# Patient Record
Sex: FEMALE | Race: WHITE | NOT HISPANIC OR LATINO | Employment: OTHER | URBAN - METROPOLITAN AREA
[De-identification: names, ages, dates, MRNs, and addresses within clinical notes are randomized per-mention and may not be internally consistent; named-entity substitution may affect disease eponyms.]

---

## 2019-07-13 ENCOUNTER — HOSPITAL ENCOUNTER (EMERGENCY)
Facility: CLINIC | Age: 69
Discharge: HOME | End: 2019-07-13
Attending: PHYSICIAN ASSISTANT | Admitting: PHYSICIAN ASSISTANT
Payer: COMMERCIAL

## 2019-07-13 VITALS
RESPIRATION RATE: 18 BRPM | OXYGEN SATURATION: 95 % | SYSTOLIC BLOOD PRESSURE: 94 MMHG | DIASTOLIC BLOOD PRESSURE: 64 MMHG | WEIGHT: 161 LBS | BODY MASS INDEX: 31.61 KG/M2 | HEIGHT: 60 IN | HEART RATE: 109 BPM | TEMPERATURE: 98.2 F

## 2019-07-13 DIAGNOSIS — M54.16 LUMBAR RADICULOPATHY: ICD-10-CM

## 2019-07-13 PROCEDURE — 99284 EMERGENCY DEPT VISIT MOD MDM: CPT | Mod: 25

## 2019-07-13 PROCEDURE — 96372 THER/PROPH/DIAG INJ SC/IM: CPT

## 2019-07-13 PROCEDURE — 25000128 H RX IP 250 OP 636: Performed by: PHYSICIAN ASSISTANT

## 2019-07-13 PROCEDURE — 25000132 ZZH RX MED GY IP 250 OP 250 PS 637: Performed by: PHYSICIAN ASSISTANT

## 2019-07-13 RX ORDER — LETROZOLE 2.5 MG/1
2.5 TABLET, FILM COATED ORAL DAILY
COMMUNITY

## 2019-07-13 RX ORDER — ASPIRIN 81 MG/1
81 TABLET, CHEWABLE ORAL DAILY
COMMUNITY

## 2019-07-13 RX ORDER — LIDOCAINE 4 G/G
1 PATCH TOPICAL ONCE
Status: DISCONTINUED | OUTPATIENT
Start: 2019-07-13 | End: 2019-07-13 | Stop reason: HOSPADM

## 2019-07-13 RX ORDER — KETOROLAC TROMETHAMINE 30 MG/ML
30 INJECTION, SOLUTION INTRAMUSCULAR; INTRAVENOUS ONCE
Status: COMPLETED | OUTPATIENT
Start: 2019-07-13 | End: 2019-07-13

## 2019-07-13 RX ORDER — PREGABALIN 200 MG/1
100 CAPSULE ORAL 3 TIMES DAILY
Status: ON HOLD | COMMUNITY
End: 2024-04-27

## 2019-07-13 RX ORDER — HYDROCODONE BITARTRATE AND ACETAMINOPHEN 5; 325 MG/1; MG/1
1 TABLET ORAL ONCE
Status: COMPLETED | OUTPATIENT
Start: 2019-07-13 | End: 2019-07-13

## 2019-07-13 RX ORDER — MONTELUKAST SODIUM 10 MG/1
10 TABLET ORAL AT BEDTIME
COMMUNITY

## 2019-07-13 RX ORDER — FUROSEMIDE 40 MG
40 TABLET ORAL DAILY
Status: ON HOLD | COMMUNITY
End: 2024-05-01

## 2019-07-13 RX ORDER — CETIRIZINE HYDROCHLORIDE 10 MG/1
10 TABLET ORAL DAILY
COMMUNITY

## 2019-07-13 RX ORDER — SPIRONOLACTONE 25 MG/1
25 TABLET ORAL DAILY
Status: ON HOLD | COMMUNITY
End: 2024-04-27

## 2019-07-13 RX ORDER — METHOCARBAMOL 750 MG/1
750 TABLET, FILM COATED ORAL ONCE
Status: COMPLETED | OUTPATIENT
Start: 2019-07-13 | End: 2019-07-13

## 2019-07-13 RX ORDER — UBIDECARENONE 100 MG
CAPSULE ORAL DAILY
COMMUNITY

## 2019-07-13 RX ORDER — LOSARTAN POTASSIUM 25 MG/1
25 TABLET ORAL DAILY
Status: ON HOLD | COMMUNITY
End: 2024-04-27

## 2019-07-13 RX ADMIN — METHOCARBAMOL TABLETS 750 MG: 750 TABLET, COATED ORAL at 12:52

## 2019-07-13 RX ADMIN — KETOROLAC TROMETHAMINE 30 MG: 30 INJECTION, SOLUTION INTRAMUSCULAR; INTRAVENOUS at 12:53

## 2019-07-13 RX ADMIN — HYDROCODONE BITARTRATE AND ACETAMINOPHEN 1 TABLET: 5; 325 TABLET ORAL at 12:52

## 2019-07-13 RX ADMIN — HYDROCODONE BITARTRATE AND ACETAMINOPHEN 1 TABLET: 5; 325 TABLET ORAL at 14:01

## 2019-07-13 RX ADMIN — LIDOCAINE 1 PATCH: 560 PATCH PERCUTANEOUS; TOPICAL; TRANSDERMAL at 12:55

## 2019-07-13 ASSESSMENT — ENCOUNTER SYMPTOMS
WEAKNESS: 1
NECK PAIN: 0
ABDOMINAL PAIN: 0
BACK PAIN: 1
HEADACHES: 0
NUMBNESS: 0
MYALGIAS: 1
SHORTNESS OF BREATH: 0

## 2019-07-13 ASSESSMENT — MIFFLIN-ST. JEOR: SCORE: 1176.79

## 2019-07-13 NOTE — ED NOTES
Bed: ED22  Expected date: 7/13/19  Expected time:   Means of arrival: Ambulance  Comments:  Vanessa Roberson

## 2019-07-13 NOTE — ED TRIAGE NOTES
pt comes in from home aftr fall in bathroom. pt has bulging disc. declined neck pain. pt not able to stand, normally uses walker. tylenol 650mg oral for pain. BP 90/50 in route.

## 2019-07-13 NOTE — ED PROVIDER NOTES
History     Chief Complaint:  Back Pain and Fall    The history is provided by the patient.      Cathleen Ty is a 68 year old female with a history of CHF, hyperlipidemia, hypertension, and a bulging L3 disc, who presents to the emergency department via EMS for evaluation of a fall with back pain. For the last three months, the patient has been experiencing lower back pain due to her bulging L3 disc. To note, 9 days ago, the patient was admitted to [facility] for her back pain and discharged home with home PT. This morning, the patient got up to use the bathroom this morning. She was using her walker to get there, when that got away from her, which caused her to loss her balance, and fall on her right side. She states she hit her head but did not lose consciousness. She complains of bilateral arm pain and pain on the front of her right leg that goes down to her foot. She also states she feels weak and cannot walk on it. Her fall and inability to get up prompted the patient to call EMS who transferred her here for further evaluation.   Patient denies any new pain in her back. Patient denies any numbness or tingling sensation in her extremities, chest pain, abdominal pain, neck pain, headache, or use of blood thinners.     Allergies:  Aspirin  Atorvastatin calcium  Dilaudid  Penicillins  Percocet  Sulfa drugs      Medications:    Aspirin  Zyrtec  Hydrocodone  Lasix  Femara  Lorazepam  Cozaar  Lopressor  Singulair  Lyrica  Aldactone    Past Medical History:    Back pain  CHF  Hyperlipidemia  Malignant neoplasm of breast  Hypertension  DJD  Anxiety     Past Surgical History:    Mastectomy  Appendectomy  Tonsillectomy  Tubal ligation  Knee replacement  Right oophorectomy     Family History:    Heart disease: Mother and Father    Social History:  Former tobacco user.  Negative for alcohol use.  Negative for drug use.  Marital Status:        Review of Systems   Respiratory: Negative for shortness of breath.     Cardiovascular: Negative for chest pain.   Gastrointestinal: Negative for abdominal pain.   Musculoskeletal: Positive for back pain and myalgias (Right leg). Negative for neck pain.   Neurological: Positive for weakness. Negative for numbness and headaches.   All other systems reviewed and are negative.       Physical Exam     Patient Vitals for the past 24 hrs:   BP Temp Temp src Pulse Heart Rate Resp SpO2 Height Weight   07/13/19 1500 90/67 -- -- 102 -- -- -- -- --   07/13/19 1415 (!) 85/52 -- -- 104 -- -- -- -- --   07/13/19 1400 100/65 -- -- 114 -- -- -- -- --   07/13/19 1345 112/64 -- -- 101 -- -- -- -- --   07/13/19 1330 101/66 -- -- 96 -- -- 95 % -- --   07/13/19 1315 100/63 -- -- 99 -- -- 95 % -- --   07/13/19 1300 106/69 -- -- 96 -- -- 95 % -- --   07/13/19 1245 104/70 -- -- 95 -- -- 92 % -- --   07/13/19 1230 107/52 -- -- 97 -- -- 94 % -- --   07/13/19 1220 103/62 98.2  F (36.8  C) Oral 101 101 18 95 % 1.524 m (5') 73 kg (161 lb)     Physical Exam  Constitutional: comfortable and well appearing.   Head: No external signs of trauma noted to head or face.   Eyes: Pupils are equal, round, and reactive to light. Conjunctiva normal. EOMI.  ENT: MMM. Normal voice.   Neck: non-tender to palpation of cervical spine and paraspinal muscles. Normal ROM.   Cardiovascular: Normal rate, regular rhythm, and intact distal pulses.    Respiratory/Chest: Effort normal. No respiratory distress. Lungs clear to auscultation bilaterally. No chest wall tenderness, crepitus, ecchymosis, or deformity.   GI: Soft. Non-tender. No rebound or guarding.   Musculoskeletal: extremities are without deformity, non-tender to palpation, with normal ROM of all joints. No edema noted. No midline cervical, thoracic, or lumbar spine tenderness to palpation.   Neurological: Alert and Oriented x 3. Speech normal. Moves all extremities equally. CN II-XII intact. Coordination normal. 5/5 strength of bilateral upper and lower extremities, including  hip flexion, knee flex/ext, plantarflexion/dorsiflexion. Sensation intact to light touch in bilateral lower extremities. Normal patellar reflexes bilaterally.   Psychiatric: Appropriate mood, affect, and behavior.   Skin: Skin is warm and dry. Abrasion to right elbow.       Emergency Department Course   Interventions:  1252 Robaxin 750 mg PO  1252 Norco 1 tablet PO  1253 Toradol 30 mg IM  1255 Lidocaine Patch Transdermal  1401 [place] 1 tablet PO    Emergency Department Course:  Nursing notes and vitals reviewed. 1225 I performed an exam of the patient as documented above.     IV inserted. Medicine administered as documented above.     1349 I rechecked the patient and discussed the results of her workup thus far.     Findings and plan explained to the Patient. Patient discharged home with instructions regarding supportive care, medications, and reasons to return. The importance of close follow-up was reviewed.     I personally answered all related questions prior to discharge.    Impression & Plan    Medical Decision Makin year old female presenting for evaluation of back pain and a fall. She has a history of lumbar radiculopathy with L4 nerve root compression. History is suggestive of mechanical fall without concern for syncope. She did hit her head, but denies LOC. She is not anticoagulated. She has normal neurologic exam. There is no indication for head CT at this time as suspicion for intracranial bleeding is very low. She has no focal bony spine tenderness to indicate need for spine imaging at this time. She has no other evidence of traumatic injury on head to toe trauma exam. Patient explains that the pain she is experiencing here is the same pain she has been dealing with for months from her bulging nerve and pinched nerve. She reports that she couldn't get up because of the pain. She has no weakness or numbness in her extremities on exam. She just had an MRI this month and I do not feel she requires  repeat imaging at this time. Again, she has no bony spine tenderness on exam and I have low suspicion for fracture at this time so x-rays are not indicated either. Her pain has improved with medications here and she is ambulatory with a steady gait with her walker. Patient is requesting admission so she can have surgery on her back. After I explained that she can be admitted for pain control, but that she would not get surgery, she wanted to be discharged home, which I think is appropriate given her pain seems controlled and she is ambulatory here with steady gait with her walker. She already has pain medications at home and therefore will not be discharged with any new medications as no new injuries have been identified. She was instructed to follow-up with her PCP. She also has follow-up scheduled for epidural spine injection. She was instructed to return to the ED for any new or worsening symptoms, including worsening pain, weakness, or other concerning symptoms.     Critical Care time:  none    Diagnosis:    ICD-10-CM    1. Lumbar radiculopathy M54.16 Walker DME       Disposition:  discharged to home    Scribe Disclosure:  I, Mariann Wisdom, am serving as a scribe on 7/13/2019 at 12:42 PM to personally document services performed by Cleo Darling PA-C based on my observations and the provider's statements to me.     Mariann Wisdom  7/13/2019   [facility] EMERGENCY DEPARTMENT       Cleo Darling PA-C  07/13/19 5750

## 2019-07-13 NOTE — ED AVS SNAPSHOT
[facility] Emergency Department  [address]  [facility] [address]  Phone:  [phone]  Fax:  [phone]                                    Cathleen Ty   MRN: [id number]    Department:  [facility] Emergency Department   Date of Visit:  7/13/2019           After Visit Summary Signature Page    I have received my discharge instructions, and my questions have been answered. I have discussed any challenges I see with this plan with the nurse or doctor.    ..........................................................................................................................................  Patient/Patient Representative Signature      ..........................................................................................................................................  Patient Representative Print Name and Relationship to Patient    ..................................................               ................................................  Date                                   Time    ..........................................................................................................................................  Reviewed by Signature/Title    ...................................................              ..............................................  Date                                               Time          22EPIC Rev 08/18

## 2021-11-13 ENCOUNTER — APPOINTMENT (OUTPATIENT)
Dept: CT IMAGING | Facility: CLINIC | Age: 71
End: 2021-11-13
Attending: EMERGENCY MEDICINE
Payer: COMMERCIAL

## 2021-11-13 ENCOUNTER — HOSPITAL ENCOUNTER (EMERGENCY)
Facility: CLINIC | Age: 71
Discharge: HOME | End: 2021-11-13
Attending: EMERGENCY MEDICINE | Admitting: EMERGENCY MEDICINE
Payer: COMMERCIAL

## 2021-11-13 ENCOUNTER — APPOINTMENT (OUTPATIENT)
Dept: GENERAL RADIOLOGY | Facility: CLINIC | Age: 71
End: 2021-11-13
Attending: EMERGENCY MEDICINE
Payer: COMMERCIAL

## 2021-11-13 VITALS
SYSTOLIC BLOOD PRESSURE: 110 MMHG | OXYGEN SATURATION: 96 % | WEIGHT: 160 LBS | HEART RATE: 81 BPM | RESPIRATION RATE: 14 BRPM | HEIGHT: 60 IN | BODY MASS INDEX: 31.41 KG/M2 | TEMPERATURE: 97.8 F | DIASTOLIC BLOOD PRESSURE: 70 MMHG

## 2021-11-13 DIAGNOSIS — M25.562 ACUTE PAIN OF LEFT KNEE: ICD-10-CM

## 2021-11-13 DIAGNOSIS — R73.9 HYPERGLYCEMIA: ICD-10-CM

## 2021-11-13 DIAGNOSIS — R55 NEAR SYNCOPE: ICD-10-CM

## 2021-11-13 DIAGNOSIS — R42 LIGHTHEADEDNESS: ICD-10-CM

## 2021-11-13 LAB
ANION GAP SERPL CALCULATED.3IONS-SCNC: 10 MMOL/L (ref 3–14)
ATRIAL RATE - MUSE: 79 BPM
BASOPHILS # BLD AUTO: 0 10E3/UL (ref 0–0.2)
BASOPHILS NFR BLD AUTO: 1 %
BUN SERPL-MCNC: 25 MG/DL (ref 7–30)
CALCIUM SERPL-MCNC: 8.6 MG/DL (ref 8.5–10.1)
CHLORIDE BLD-SCNC: 105 MMOL/L (ref 94–109)
CO2 SERPL-SCNC: 24 MMOL/L (ref 20–32)
CREAT SERPL-MCNC: 0.93 MG/DL (ref 0.52–1.04)
DIASTOLIC BLOOD PRESSURE - MUSE: NORMAL MMHG
EOSINOPHIL # BLD AUTO: 0.1 10E3/UL (ref 0–0.7)
EOSINOPHIL NFR BLD AUTO: 1 %
ERYTHROCYTE [DISTWIDTH] IN BLOOD BY AUTOMATED COUNT: 14.2 % (ref 10–15)
ETHANOL SERPL-MCNC: <0.01 G/DL
GFR SERPL CREATININE-BSD FRML MDRD: 62 ML/MIN/1.73M2
GLUCOSE BLD-MCNC: 297 MG/DL (ref 70–99)
HCT VFR BLD AUTO: 36.9 % (ref 35–47)
HGB BLD-MCNC: 12.3 G/DL (ref 11.7–15.7)
HOLD SPECIMEN: NORMAL
IMM GRANULOCYTES # BLD: 0 10E3/UL
IMM GRANULOCYTES NFR BLD: 0 %
INTERPRETATION ECG - MUSE: NORMAL
LYMPHOCYTES # BLD AUTO: 4.1 10E3/UL (ref 0.8–5.3)
LYMPHOCYTES NFR BLD AUTO: 46 %
MCH RBC QN AUTO: 29.6 PG (ref 26.5–33)
MCHC RBC AUTO-ENTMCNC: 33.3 G/DL (ref 31.5–36.5)
MCV RBC AUTO: 89 FL (ref 78–100)
MONOCYTES # BLD AUTO: 0.9 10E3/UL (ref 0–1.3)
MONOCYTES NFR BLD AUTO: 10 %
NEUTROPHILS # BLD AUTO: 3.7 10E3/UL (ref 1.6–8.3)
NEUTROPHILS NFR BLD AUTO: 42 %
NRBC # BLD AUTO: 0 10E3/UL
NRBC BLD AUTO-RTO: 0 /100
P AXIS - MUSE: 48 DEGREES
PLATELET # BLD AUTO: 188 10E3/UL (ref 150–450)
POTASSIUM BLD-SCNC: 3.8 MMOL/L (ref 3.4–5.3)
PR INTERVAL - MUSE: 252 MS
QRS DURATION - MUSE: 96 MS
QT - MUSE: 370 MS
QTC - MUSE: 424 MS
R AXIS - MUSE: -44 DEGREES
RBC # BLD AUTO: 4.15 10E6/UL (ref 3.8–5.2)
SODIUM SERPL-SCNC: 139 MMOL/L (ref 133–144)
SYSTOLIC BLOOD PRESSURE - MUSE: NORMAL MMHG
T AXIS - MUSE: 31 DEGREES
TROPONIN I SERPL-MCNC: <0.015 UG/L (ref 0–0.04)
VENTRICULAR RATE- MUSE: 79 BPM
WBC # BLD AUTO: 8.9 10E3/UL (ref 4–11)

## 2021-11-13 PROCEDURE — 72125 CT NECK SPINE W/O DYE: CPT

## 2021-11-13 PROCEDURE — 85025 COMPLETE CBC W/AUTO DIFF WBC: CPT | Performed by: EMERGENCY MEDICINE

## 2021-11-13 PROCEDURE — 93005 ELECTROCARDIOGRAM TRACING: CPT

## 2021-11-13 PROCEDURE — 36415 COLL VENOUS BLD VENIPUNCTURE: CPT | Performed by: EMERGENCY MEDICINE

## 2021-11-13 PROCEDURE — 73562 X-RAY EXAM OF KNEE 3: CPT | Mod: LT

## 2021-11-13 PROCEDURE — 80048 BASIC METABOLIC PNL TOTAL CA: CPT | Performed by: EMERGENCY MEDICINE

## 2021-11-13 PROCEDURE — 82077 ASSAY SPEC XCP UR&BREATH IA: CPT | Performed by: EMERGENCY MEDICINE

## 2021-11-13 PROCEDURE — 84484 ASSAY OF TROPONIN QUANT: CPT | Performed by: EMERGENCY MEDICINE

## 2021-11-13 PROCEDURE — 99285 EMERGENCY DEPT VISIT HI MDM: CPT | Mod: 25

## 2021-11-13 PROCEDURE — 70450 CT HEAD/BRAIN W/O DYE: CPT

## 2021-11-13 ASSESSMENT — ENCOUNTER SYMPTOMS
NECK PAIN: 0
ABDOMINAL PAIN: 0
VOMITING: 0
NAUSEA: 0
SHORTNESS OF BREATH: 0

## 2021-11-13 ASSESSMENT — MIFFLIN-ST. JEOR: SCORE: 1157.26

## 2021-11-13 NOTE — ED PROVIDER NOTES
History   Chief Complaint:  Fall     HPI   Cathleen Ty is a 71 year old female with history of CAD, hypotension, CHF, CKD, CAD and type 2 diabetes who presents with knee pain following a fall. The patient has undergone left knee replacement. She notes in the past she had an issue with syncopal episodes due to hypotension, but she has since been placed on medications that have resolved this issue. Earlier today, she was walking when she began to feel light headed, tripped and fell. She denies any loss of consciousness but did hit her head. Following this, she was able to walk but she had left knee pain. She rates this pain at 7/10. Secondary to this EMS was called. With EMS her blood sugar was 380, but she noted she had recently eaten. She denies any neck pain, chest pain, shortness of breath, abdominal pain, nausea or emesis. Of note, she was not on any blood thinners.    Review of Systems   Respiratory: Negative for shortness of breath.    Cardiovascular: Negative for chest pain.   Gastrointestinal: Negative for abdominal pain, nausea and vomiting.   Musculoskeletal: Negative for neck pain.        Left knee pain   Neurological: Negative for syncope.   All other systems reviewed and are negative.    Allergies:  Aspirin   Dilaudid   Lipitor   Penicillins  Percocet  Sulfa Drugs  Tramadol  Wound dressing adhesive  Atorvastatin  Fluconazole  Latex    Medications:  Zyrtec  Flexeril  Lasix  Femara  Lorazepam  Cozaar  Lopressor  Singulair  Lyrica  Zocor  Aldactone    Past Medical History:    CHF  Hyperlipidemia  Malignant neoplasm of breast  Tubbs neuroma  CKD  CAD  Mitral regurgitation  Hypotension  Chronic systolic heart failure  Type 2 diabetes  Hypoxia  Obesity  Nonischemic cardiomyopathy  Fibromyalgia  Lymphedema  DJD  Migraine  BEN  Allergic rhinitis  Cellulitis of right upper extremity  Anemia  Tobacco use disorder    Past Surgical History:    Appendectomy  Breast biopsy, bilateral  Tonsillectomy  Tubal  ligation  Lumbar laminectomy  Salpingo-oophorectomy  Foot surgery for bone spurs, left foot  Knee replacement, bilateral    Family History:    Mother: Heart disease, Thyroid disease  Father: Heart disease, Diabetes    Social History:  The patient was accompanied to the ED by EMS.    Physical Exam     Patient Vitals for the past 24 hrs:   BP Temp Temp src Pulse Resp SpO2 Height Weight   11/13/21 1541 -- -- -- -- -- 96 % -- --   11/13/21 1539 108/62 97.8  F (36.6  C) Temporal 89 12 -- 1.524 m (5') 72.6 kg (160 lb)       Physical Exam  Vital signs reviewed.  Nursing note reviewed.  Constitutional: Well-developed, Well-nourished.  Non-diaphoretic.      HENT: No evidence of head trauma.  No pain or instability to palpation of bony orbits, mandible, maxilla.  Good jaw opening.  No malocclusion.  No nasal septal hematoma.  OP clear and moist.    EYES:  PERRL.  EOMI.  NECK: Equivocal Cspine tenderness.  Trachea midline.  Full ROM.  Supple. No stridor.  CARDIAC:  RRR. 2+ bilat radial pulses   CHEST:  No external evidence of chest trauma  PULM: Effort  Normal.  Breath sounds clear and equal bilat  ABD:  Soft, NT/ND  No guarding, no rebound.  No external evidence of abdominal trauma / seat belt sign.  : No CVA T.    BACK:  No T or L spinous process tenderness.  Pelvis stable.  EXT:  Full ROM X4.  No tenderness, edema, crepitus or obvious deformity other than that noted below              LLE: Full range of motion, straight leg test normal, anterior ecchymosis with mild tenderness, no obvious deformities or lacerations  NEURO:  Alert, Oriented.  5/5 UE and LE, proximal and distal.  Sensation intact to LT.   SKIN :  Warm.  Dry.   No erythema.  No rash  PSYCH.:  Normal judgment.  Normal affect.    Emergency Department Course   ECG:  ECG taken at 1623, ECG read at 1627  Atrial-sensed ventricular-paced rhythm with prolonged AV conduction  Abnormal ECG  Rate 79 bpm. KS interval 252 ms. QRS duration 96 ms. QT/QTc 370/424 ms. P-R-T  axes 48 -44 31.    Imaging:  CT Cervical Spine w/o Contrast  HEAD CT:  1.  No acute intracranial process.  CERVICAL SPINE CT:  1.  No CT evidence for acute fracture or post traumatic subluxation.  Reading per radiology    CT Head w/o Contrast  HEAD CT:  1.  No acute intracranial process.  CERVICAL SPINE CT:  1.  No CT evidence for acute fracture or post traumatic subluxation.  Reading per radiology    XR Knee Left 3 Views  Postoperative changes total knee replacement. There is no acute periprosthetic fracture or signs of hardware loosening. No definitive joint effusion. Bones are demineralized.  Reading per radiology    Laboratory:  CBC: WBC 8.9, HGB 12.3,   BMP: Glucose 297 (H) o/w WNL (Creatinine 0.93)    Troponin (Collected 1708): <0.015    Alcohol level blood: <0.01    Emergency Department Course:  Reviewed:  I reviewed nursing notes, vitals, past medical history and care everywhere    Assessments:  1608 I obtained history and examined the patient as noted above.     1750 I rechecked and updated the patient regarding the imaging results, laboratory results and the plan for care.    Disposition:  The patient was discharged to home.     Impression & Plan   Medical Decision Making:  Cathleen Ty is a 71 year old female presenting w/ knee pain/injury s/p mechanical fall     DDx includes mechanical fall, fracture, contusion, intracranial hemorrhage, skull fracture.  Doubt seizure, syncope, CVA given history and physical exam.  No other evidence of trauma on full primary and secondary trauma surveys other than areas imaged above or documented in physical exam.  Given lightheadedness and near syncope, EKG and blood work ordered.  Labs significant for hyperglycemia otherwise unremarkable. Imaging sig for no acute osseous abnormality.  Interventions as noted above.  Patient presentation seems consistent with an episode of lightheadedness or near syncope which she reports having experienced before and  attributing to relative hypotension.  Doubt sudden arrhythmia or seizure.  Given reassuring work-up and imaging inconsistent with traumatic abnormality, at this time I feel the pt is safe for discharge.  Recommendations given regarding follow up with PCP and return to the emergency department as needed for new or worsening symptoms.  Pt counseled on all results, disposition and diagnosis.  They are understanding and agreeable to plan. Patient discharged in stable condition.      Diagnosis:    ICD-10-CM    1. Acute pain of left knee  M25.562    2. Lightheadedness  R42    3. Near syncope  R55    4. Hyperglycemia  R73.9        Discharge Medications:  New Prescriptions    No medications on file       Scribe Disclosure:  I, Eloy Diaz, am serving as a scribe at 4:15 PM on 11/13/2021 to document services personally performed by Carlitos Coello MD based on my observations and the provider's statements to me.      Carlitos Coello MD  11/13/21 8618

## 2021-11-13 NOTE — DISCHARGE INSTRUCTIONS
1. -Take acetaminophen 500 to 1000 mg by mouth every 4 to 6 hours as needed for pain or fever.  Do not take more than 4000 mg in 24 hours.  Do not take within 6 hours of another acetaminophen containing medication such as norco (vicodin) or percocet.  - Take ibuprofen 600 to 800 mg by mouth every 6 to 8 hours as needed for pain or fever  2. Use ice as needed for swelling.  Drink plenty of fluids at home  3. Elevate extremity to help with swelling.  4. Follow-up with your primary doctor in 1 week for reevaluation.  5. You may return to the ED as needed for new or worsening symptoms such as fainting, chest pain, shortness of breath, fever greater than 100.4  F, reinjury, severe and uncontrollable pain, focal weakness, severe numbness and tingling, any other concerning symptoms.

## 2021-11-13 NOTE — ED TRIAGE NOTES
Patient was walking at the [facility] when her left knee gave out and she tripped and fell. Patient did not initially want to go with EMS but her knee was very painful. Patient is a diabetic and BG was 380 but she had recently eaten chinese food.

## 2021-11-14 NOTE — ED NOTES
Per Tricia RN and EDT - patient road tested and ambulated to bathroom at back independently and without incident. Declines want for knee immobilizer. Patient okay to be discharged home at this time per ED MD. Patient taking [employer] home.

## 2024-04-27 ENCOUNTER — HOSPITAL ENCOUNTER (OUTPATIENT)
Facility: CLINIC | Age: 74
Setting detail: OBSERVATION
Discharge: NURSING, REHAB OR LONG-TERM CARE | End: 2024-05-01
Attending: EMERGENCY MEDICINE | Admitting: INTERNAL MEDICINE
Payer: COMMERCIAL

## 2024-04-27 ENCOUNTER — APPOINTMENT (OUTPATIENT)
Dept: GENERAL RADIOLOGY | Facility: CLINIC | Age: 74
End: 2024-04-27
Attending: EMERGENCY MEDICINE
Payer: COMMERCIAL

## 2024-04-27 DIAGNOSIS — B33.8 RSV INFECTION: ICD-10-CM

## 2024-04-27 DIAGNOSIS — G89.29 OTHER CHRONIC PAIN: ICD-10-CM

## 2024-04-27 DIAGNOSIS — R52 MILD PAIN: ICD-10-CM

## 2024-04-27 DIAGNOSIS — F41.9 ANXIETY: Primary | ICD-10-CM

## 2024-04-27 DIAGNOSIS — R53.1 GENERALIZED WEAKNESS: ICD-10-CM

## 2024-04-27 LAB
ABO/RH(D): NORMAL
ALBUMIN SERPL BCG-MCNC: 4.4 G/DL (ref 3.5–5.2)
ALBUMIN UR-MCNC: NEGATIVE MG/DL
ALP SERPL-CCNC: 95 U/L (ref 40–150)
ALT SERPL W P-5'-P-CCNC: 19 U/L (ref 0–50)
ANION GAP SERPL CALCULATED.3IONS-SCNC: 14 MMOL/L (ref 7–15)
ANTIBODY SCREEN: NEGATIVE
APPEARANCE UR: CLEAR
AST SERPL W P-5'-P-CCNC: 28 U/L (ref 0–45)
BACTERIA #/AREA URNS HPF: ABNORMAL /HPF
BASOPHILS # BLD AUTO: ABNORMAL 10*3/UL
BASOPHILS # BLD MANUAL: 0 10E3/UL (ref 0–0.2)
BASOPHILS NFR BLD AUTO: ABNORMAL %
BASOPHILS NFR BLD MANUAL: 1 %
BILIRUB SERPL-MCNC: 0.7 MG/DL
BILIRUB UR QL STRIP: NEGATIVE
BUN SERPL-MCNC: 21.1 MG/DL (ref 8–23)
CALCIUM SERPL-MCNC: 8.9 MG/DL (ref 8.8–10.2)
CHLORIDE SERPL-SCNC: 96 MMOL/L (ref 98–107)
CK SERPL-CCNC: 72 U/L (ref 26–192)
COLOR UR AUTO: ABNORMAL
CREAT SERPL-MCNC: 1.16 MG/DL (ref 0.51–0.95)
DEPRECATED HCO3 PLAS-SCNC: 25 MMOL/L (ref 22–29)
EGFRCR SERPLBLD CKD-EPI 2021: 49 ML/MIN/1.73M2
ELLIPTOCYTES BLD QL SMEAR: SLIGHT
EOSINOPHIL # BLD AUTO: ABNORMAL 10*3/UL
EOSINOPHIL # BLD MANUAL: 0 10E3/UL (ref 0–0.7)
EOSINOPHIL NFR BLD AUTO: ABNORMAL %
EOSINOPHIL NFR BLD MANUAL: 0 %
ERYTHROCYTE [DISTWIDTH] IN BLOOD BY AUTOMATED COUNT: 16.3 % (ref 10–15)
FLUAV RNA SPEC QL NAA+PROBE: NEGATIVE
FLUBV RNA RESP QL NAA+PROBE: NEGATIVE
GLUCOSE BLDC GLUCOMTR-MCNC: 121 MG/DL (ref 70–99)
GLUCOSE BLDC GLUCOMTR-MCNC: 136 MG/DL (ref 70–99)
GLUCOSE BLDC GLUCOMTR-MCNC: 189 MG/DL (ref 70–99)
GLUCOSE SERPL-MCNC: 133 MG/DL (ref 70–99)
GLUCOSE UR STRIP-MCNC: NEGATIVE MG/DL
HCT VFR BLD AUTO: 35.4 % (ref 35–47)
HGB BLD-MCNC: 12.2 G/DL (ref 11.7–15.7)
HGB UR QL STRIP: NEGATIVE
HOLD SPECIMEN: NORMAL
HYALINE CASTS: 1 /LPF
IMM GRANULOCYTES # BLD: ABNORMAL 10*3/UL
IMM GRANULOCYTES NFR BLD: ABNORMAL %
KETONES UR STRIP-MCNC: NEGATIVE MG/DL
LACTATE SERPL-SCNC: 1.3 MMOL/L (ref 0.7–2)
LACTATE SERPL-SCNC: 2 MMOL/L (ref 0.7–2)
LACTATE SERPL-SCNC: 2.3 MMOL/L (ref 0.7–2)
LACTATE SERPL-SCNC: 2.7 MMOL/L (ref 0.7–2)
LEUKOCYTE ESTERASE UR QL STRIP: ABNORMAL
LYMPHOCYTES # BLD AUTO: ABNORMAL 10*3/UL
LYMPHOCYTES # BLD MANUAL: 1.3 10E3/UL (ref 0.8–5.3)
LYMPHOCYTES NFR BLD AUTO: ABNORMAL %
LYMPHOCYTES NFR BLD MANUAL: 38 %
MCH RBC QN AUTO: 34.5 PG (ref 26.5–33)
MCHC RBC AUTO-ENTMCNC: 34.5 G/DL (ref 31.5–36.5)
MCV RBC AUTO: 100 FL (ref 78–100)
MONOCYTES # BLD AUTO: ABNORMAL 10*3/UL
MONOCYTES # BLD MANUAL: 0.2 10E3/UL (ref 0–1.3)
MONOCYTES NFR BLD AUTO: ABNORMAL %
MONOCYTES NFR BLD MANUAL: 7 %
MUCOUS THREADS #/AREA URNS LPF: PRESENT /LPF
NEUTROPHILS # BLD AUTO: ABNORMAL 10*3/UL
NEUTROPHILS # BLD MANUAL: 1.9 10E3/UL (ref 1.6–8.3)
NEUTROPHILS NFR BLD AUTO: ABNORMAL %
NEUTROPHILS NFR BLD MANUAL: 54 %
NITRATE UR QL: NEGATIVE
NRBC # BLD AUTO: 0 10E3/UL
NRBC BLD AUTO-RTO: 0 /100
NT-PROBNP SERPL-MCNC: 1944 PG/ML (ref 0–900)
PH UR STRIP: 5.5 [PH] (ref 5–7)
PLAT MORPH BLD: ABNORMAL
PLATELET # BLD AUTO: 84 10E3/UL (ref 150–450)
POTASSIUM SERPL-SCNC: 3.5 MMOL/L (ref 3.4–5.3)
PROCALCITONIN SERPL IA-MCNC: 0.15 NG/ML
PROT SERPL-MCNC: 7 G/DL (ref 6.4–8.3)
RBC # BLD AUTO: 3.54 10E6/UL (ref 3.8–5.2)
RBC MORPH BLD: ABNORMAL
RBC URINE: <1 /HPF
RSV RNA SPEC NAA+PROBE: POSITIVE
SARS-COV-2 RNA RESP QL NAA+PROBE: NEGATIVE
SODIUM SERPL-SCNC: 135 MMOL/L (ref 135–145)
SP GR UR STRIP: 1.01 (ref 1–1.03)
SPECIMEN EXPIRATION DATE: NORMAL
SQUAMOUS EPITHELIAL: <1 /HPF
UROBILINOGEN UR STRIP-MCNC: NORMAL MG/DL
WBC # BLD AUTO: 3.5 10E3/UL (ref 4–11)
WBC URINE: 8 /HPF

## 2024-04-27 PROCEDURE — 81001 URINALYSIS AUTO W/SCOPE: CPT | Performed by: PHYSICIAN ASSISTANT

## 2024-04-27 PROCEDURE — 36415 COLL VENOUS BLD VENIPUNCTURE: CPT | Performed by: EMERGENCY MEDICINE

## 2024-04-27 PROCEDURE — 87040 BLOOD CULTURE FOR BACTERIA: CPT | Performed by: NURSE PRACTITIONER

## 2024-04-27 PROCEDURE — G0378 HOSPITAL OBSERVATION PER HR: HCPCS

## 2024-04-27 PROCEDURE — 82550 ASSAY OF CK (CPK): CPT | Performed by: EMERGENCY MEDICINE

## 2024-04-27 PROCEDURE — 84145 PROCALCITONIN (PCT): CPT | Performed by: NURSE PRACTITIONER

## 2024-04-27 PROCEDURE — 250N000013 HC RX MED GY IP 250 OP 250 PS 637: Performed by: STUDENT IN AN ORGANIZED HEALTH CARE EDUCATION/TRAINING PROGRAM

## 2024-04-27 PROCEDURE — 250N000013 HC RX MED GY IP 250 OP 250 PS 637: Performed by: EMERGENCY MEDICINE

## 2024-04-27 PROCEDURE — 85027 COMPLETE CBC AUTOMATED: CPT | Performed by: EMERGENCY MEDICINE

## 2024-04-27 PROCEDURE — 83605 ASSAY OF LACTIC ACID: CPT | Performed by: NURSE PRACTITIONER

## 2024-04-27 PROCEDURE — 99222 1ST HOSP IP/OBS MODERATE 55: CPT | Performed by: PHYSICIAN ASSISTANT

## 2024-04-27 PROCEDURE — 71046 X-RAY EXAM CHEST 2 VIEWS: CPT

## 2024-04-27 PROCEDURE — 250N000009 HC RX 250: Performed by: PHYSICIAN ASSISTANT

## 2024-04-27 PROCEDURE — 85007 BL SMEAR W/DIFF WBC COUNT: CPT | Performed by: EMERGENCY MEDICINE

## 2024-04-27 PROCEDURE — 250N000011 HC RX IP 250 OP 636: Performed by: NURSE PRACTITIONER

## 2024-04-27 PROCEDURE — 83880 ASSAY OF NATRIURETIC PEPTIDE: CPT | Performed by: EMERGENCY MEDICINE

## 2024-04-27 PROCEDURE — 87637 SARSCOV2&INF A&B&RSV AMP PRB: CPT | Performed by: EMERGENCY MEDICINE

## 2024-04-27 PROCEDURE — 83605 ASSAY OF LACTIC ACID: CPT | Performed by: EMERGENCY MEDICINE

## 2024-04-27 PROCEDURE — 94640 AIRWAY INHALATION TREATMENT: CPT

## 2024-04-27 PROCEDURE — 96361 HYDRATE IV INFUSION ADD-ON: CPT

## 2024-04-27 PROCEDURE — 86900 BLOOD TYPING SEROLOGIC ABO: CPT | Performed by: EMERGENCY MEDICINE

## 2024-04-27 PROCEDURE — 250N000009 HC RX 250: Performed by: EMERGENCY MEDICINE

## 2024-04-27 PROCEDURE — 250N000013 HC RX MED GY IP 250 OP 250 PS 637: Performed by: NURSE PRACTITIONER

## 2024-04-27 PROCEDURE — 258N000003 HC RX IP 258 OP 636: Performed by: NURSE PRACTITIONER

## 2024-04-27 PROCEDURE — 96374 THER/PROPH/DIAG INJ IV PUSH: CPT

## 2024-04-27 PROCEDURE — 82374 ASSAY BLOOD CARBON DIOXIDE: CPT | Performed by: EMERGENCY MEDICINE

## 2024-04-27 PROCEDURE — 250N000013 HC RX MED GY IP 250 OP 250 PS 637: Performed by: PHYSICIAN ASSISTANT

## 2024-04-27 PROCEDURE — 99285 EMERGENCY DEPT VISIT HI MDM: CPT | Mod: 25

## 2024-04-27 PROCEDURE — 82962 GLUCOSE BLOOD TEST: CPT

## 2024-04-27 PROCEDURE — 36415 COLL VENOUS BLD VENIPUNCTURE: CPT | Performed by: NURSE PRACTITIONER

## 2024-04-27 RX ORDER — ACETAMINOPHEN 650 MG/1
650 SUPPOSITORY RECTAL EVERY 4 HOURS PRN
Status: DISCONTINUED | OUTPATIENT
Start: 2024-04-27 | End: 2024-05-01 | Stop reason: HOSPADM

## 2024-04-27 RX ORDER — PANTOPRAZOLE SODIUM 40 MG/1
40 TABLET, DELAYED RELEASE ORAL
Status: DISCONTINUED | OUTPATIENT
Start: 2024-04-27 | End: 2024-04-28

## 2024-04-27 RX ORDER — NICOTINE POLACRILEX 4 MG
15-30 LOZENGE BUCCAL
Status: DISCONTINUED | OUTPATIENT
Start: 2024-04-27 | End: 2024-05-01 | Stop reason: HOSPADM

## 2024-04-27 RX ORDER — IPRATROPIUM BROMIDE AND ALBUTEROL SULFATE 2.5; .5 MG/3ML; MG/3ML
3 SOLUTION RESPIRATORY (INHALATION) EVERY 6 HOURS PRN
Status: DISCONTINUED | OUTPATIENT
Start: 2024-04-27 | End: 2024-05-01 | Stop reason: HOSPADM

## 2024-04-27 RX ORDER — LORAZEPAM 0.5 MG/1
.5-1 TABLET ORAL
Status: ON HOLD | COMMUNITY
End: 2024-05-01

## 2024-04-27 RX ORDER — AMOXICILLIN 250 MG
1 CAPSULE ORAL 2 TIMES DAILY PRN
Status: DISCONTINUED | OUTPATIENT
Start: 2024-04-27 | End: 2024-05-01 | Stop reason: HOSPADM

## 2024-04-27 RX ORDER — CODEINE PHOSPHATE AND GUAIFENESIN 10; 100 MG/5ML; MG/5ML
5 SOLUTION ORAL EVERY 4 HOURS PRN
Status: DISCONTINUED | OUTPATIENT
Start: 2024-04-27 | End: 2024-04-27

## 2024-04-27 RX ORDER — BENZONATATE 100 MG/1
100 CAPSULE ORAL 3 TIMES DAILY PRN
Status: DISCONTINUED | OUTPATIENT
Start: 2024-04-27 | End: 2024-05-01 | Stop reason: HOSPADM

## 2024-04-27 RX ORDER — GUAIFENESIN 200 MG/10ML
200 LIQUID ORAL EVERY 4 HOURS PRN
Status: DISCONTINUED | OUTPATIENT
Start: 2024-04-27 | End: 2024-04-27

## 2024-04-27 RX ORDER — PREGABALIN 200 MG/1
200 CAPSULE ORAL 2 TIMES DAILY
Status: ON HOLD | COMMUNITY
End: 2024-05-01

## 2024-04-27 RX ORDER — SPIRONOLACTONE 25 MG/1
12.5 TABLET ORAL DAILY
COMMUNITY

## 2024-04-27 RX ORDER — IPRATROPIUM BROMIDE AND ALBUTEROL SULFATE 2.5; .5 MG/3ML; MG/3ML
3 SOLUTION RESPIRATORY (INHALATION)
Status: COMPLETED | OUTPATIENT
Start: 2024-04-27 | End: 2024-04-27

## 2024-04-27 RX ORDER — ROSUVASTATIN CALCIUM 20 MG/1
20 TABLET, COATED ORAL AT BEDTIME
COMMUNITY

## 2024-04-27 RX ORDER — DEXTROSE MONOHYDRATE 25 G/50ML
25-50 INJECTION, SOLUTION INTRAVENOUS
Status: DISCONTINUED | OUTPATIENT
Start: 2024-04-27 | End: 2024-05-01 | Stop reason: HOSPADM

## 2024-04-27 RX ORDER — DULOXETIN HYDROCHLORIDE 30 MG/1
30 CAPSULE, DELAYED RELEASE ORAL DAILY
COMMUNITY

## 2024-04-27 RX ORDER — FINASTERIDE 5 MG/1
2.5 TABLET, FILM COATED ORAL DAILY
COMMUNITY

## 2024-04-27 RX ORDER — ONDANSETRON 2 MG/ML
4 INJECTION INTRAMUSCULAR; INTRAVENOUS EVERY 6 HOURS PRN
Status: DISCONTINUED | OUTPATIENT
Start: 2024-04-27 | End: 2024-05-01 | Stop reason: HOSPADM

## 2024-04-27 RX ORDER — AMOXICILLIN 250 MG
2 CAPSULE ORAL 2 TIMES DAILY PRN
Status: DISCONTINUED | OUTPATIENT
Start: 2024-04-27 | End: 2024-05-01 | Stop reason: HOSPADM

## 2024-04-27 RX ORDER — OLANZAPINE 5 MG/1
5-10 TABLET ORAL AT BEDTIME
COMMUNITY

## 2024-04-27 RX ORDER — TORSEMIDE 20 MG/1
60 TABLET ORAL DAILY
COMMUNITY

## 2024-04-27 RX ORDER — GUAIFENESIN/DEXTROMETHORPHAN 100-10MG/5
10 SYRUP ORAL EVERY 4 HOURS PRN
Status: DISCONTINUED | OUTPATIENT
Start: 2024-04-27 | End: 2024-05-01

## 2024-04-27 RX ORDER — ACYCLOVIR 400 MG/1
400 TABLET ORAL DAILY
COMMUNITY

## 2024-04-27 RX ORDER — CIPROFLOXACIN 2 MG/ML
400 INJECTION, SOLUTION INTRAVENOUS EVERY 12 HOURS
Status: DISCONTINUED | OUTPATIENT
Start: 2024-04-27 | End: 2024-04-28

## 2024-04-27 RX ORDER — SODIUM CHLORIDE, SODIUM LACTATE, POTASSIUM CHLORIDE, CALCIUM CHLORIDE 600; 310; 30; 20 MG/100ML; MG/100ML; MG/100ML; MG/100ML
INJECTION, SOLUTION INTRAVENOUS CONTINUOUS
Status: ACTIVE | OUTPATIENT
Start: 2024-04-27 | End: 2024-04-28

## 2024-04-27 RX ORDER — ACETAMINOPHEN 325 MG/1
650 TABLET ORAL EVERY 4 HOURS PRN
Status: DISCONTINUED | OUTPATIENT
Start: 2024-04-27 | End: 2024-05-01 | Stop reason: HOSPADM

## 2024-04-27 RX ORDER — ONDANSETRON 4 MG/1
4 TABLET, ORALLY DISINTEGRATING ORAL EVERY 6 HOURS PRN
Status: DISCONTINUED | OUTPATIENT
Start: 2024-04-27 | End: 2024-05-01 | Stop reason: HOSPADM

## 2024-04-27 RX ORDER — SODIUM CHLORIDE 9 MG/ML
INJECTION, SOLUTION INTRAVENOUS CONTINUOUS
Status: DISCONTINUED | OUTPATIENT
Start: 2024-04-27 | End: 2024-04-27

## 2024-04-27 RX ORDER — LIDOCAINE 40 MG/G
CREAM TOPICAL
Status: DISCONTINUED | OUTPATIENT
Start: 2024-04-27 | End: 2024-05-01 | Stop reason: HOSPADM

## 2024-04-27 RX ORDER — PANTOPRAZOLE SODIUM 40 MG/1
40 TABLET, DELAYED RELEASE ORAL DAILY
COMMUNITY

## 2024-04-27 RX ADMIN — ACETAMINOPHEN 650 MG: 325 TABLET, FILM COATED ORAL at 21:40

## 2024-04-27 RX ADMIN — SODIUM CHLORIDE, POTASSIUM CHLORIDE, SODIUM LACTATE AND CALCIUM CHLORIDE 500 ML: 600; 310; 30; 20 INJECTION, SOLUTION INTRAVENOUS at 17:24

## 2024-04-27 RX ADMIN — SODIUM CHLORIDE, POTASSIUM CHLORIDE, SODIUM LACTATE AND CALCIUM CHLORIDE: 600; 310; 30; 20 INJECTION, SOLUTION INTRAVENOUS at 22:34

## 2024-04-27 RX ADMIN — ACETAMINOPHEN 650 MG: 325 TABLET, FILM COATED ORAL at 16:25

## 2024-04-27 RX ADMIN — IPRATROPIUM BROMIDE AND ALBUTEROL SULFATE 3 ML: .5; 3 SOLUTION RESPIRATORY (INHALATION) at 14:25

## 2024-04-27 RX ADMIN — BENZONATATE 100 MG: 100 CAPSULE ORAL at 20:12

## 2024-04-27 RX ADMIN — SODIUM CHLORIDE, POTASSIUM CHLORIDE, SODIUM LACTATE AND CALCIUM CHLORIDE 500 ML: 600; 310; 30; 20 INJECTION, SOLUTION INTRAVENOUS at 19:59

## 2024-04-27 RX ADMIN — GUAIFENESIN SYRUP AND DEXTROMETHORPHAN 10 ML: 100; 10 SYRUP ORAL at 23:26

## 2024-04-27 RX ADMIN — PANTOPRAZOLE SODIUM 40 MG: 40 TABLET, DELAYED RELEASE ORAL at 16:25

## 2024-04-27 RX ADMIN — IPRATROPIUM BROMIDE AND ALBUTEROL SULFATE 3 ML: .5; 3 SOLUTION RESPIRATORY (INHALATION) at 08:57

## 2024-04-27 RX ADMIN — IPRATROPIUM BROMIDE AND ALBUTEROL SULFATE 3 ML: .5; 3 SOLUTION RESPIRATORY (INHALATION) at 08:56

## 2024-04-27 RX ADMIN — LIDOCAINE HYDROCHLORIDE 3 ML: 40 INJECTION, SOLUTION RETROBULBAR; TOPICAL at 10:20

## 2024-04-27 RX ADMIN — SODIUM CHLORIDE, POTASSIUM CHLORIDE, SODIUM LACTATE AND CALCIUM CHLORIDE: 600; 310; 30; 20 INJECTION, SOLUTION INTRAVENOUS at 18:09

## 2024-04-27 RX ADMIN — CIPROFLOXACIN 400 MG: 400 INJECTION, SOLUTION INTRAVENOUS at 22:59

## 2024-04-27 RX ADMIN — IPRATROPIUM BROMIDE AND ALBUTEROL SULFATE 3 ML: .5; 3 SOLUTION RESPIRATORY (INHALATION) at 22:58

## 2024-04-27 RX ADMIN — GUAIFENESIN AND CODEINE PHOSPHATE 5 ML: 100; 10 SOLUTION ORAL at 10:37

## 2024-04-27 RX ADMIN — GUAIFENESIN 200 MG: 100 LIQUID ORAL at 21:40

## 2024-04-27 RX ADMIN — IPRATROPIUM BROMIDE AND ALBUTEROL SULFATE 3 ML: .5; 3 SOLUTION RESPIRATORY (INHALATION) at 08:44

## 2024-04-27 RX ADMIN — GUAIFENESIN 200 MG: 100 LIQUID ORAL at 16:25

## 2024-04-27 ASSESSMENT — ACTIVITIES OF DAILY LIVING (ADL)
ADLS_ACUITY_SCORE: 40
ADLS_ACUITY_SCORE: 35
ADLS_ACUITY_SCORE: 37
ADLS_ACUITY_SCORE: 50
ADLS_ACUITY_SCORE: 37
ADLS_ACUITY_SCORE: 46
ADLS_ACUITY_SCORE: 46
ADLS_ACUITY_SCORE: 37
ADLS_ACUITY_SCORE: 50
ADLS_ACUITY_SCORE: 33
ADLS_ACUITY_SCORE: 40
ADLS_ACUITY_SCORE: 40
ADLS_ACUITY_SCORE: 50
ADLS_ACUITY_SCORE: 37

## 2024-04-27 ASSESSMENT — COLUMBIA-SUICIDE SEVERITY RATING SCALE - C-SSRS
2. HAVE YOU ACTUALLY HAD ANY THOUGHTS OF KILLING YOURSELF IN THE PAST MONTH?: NO
1. IN THE PAST MONTH, HAVE YOU WISHED YOU WERE DEAD OR WISHED YOU COULD GO TO SLEEP AND NOT WAKE UP?: NO
6. HAVE YOU EVER DONE ANYTHING, STARTED TO DO ANYTHING, OR PREPARED TO DO ANYTHING TO END YOUR LIFE?: NO

## 2024-04-27 NOTE — PLAN OF CARE
"PRIMARY DIAGNOSIS: GENERALIZED WEAKNESS    OUTPATIENT/OBSERVATION GOALS TO BE MET BEFORE DISCHARGE  1. Orthostatic performed: N/A    2. Tolerating PO medications: Yes    3. Return to near baseline physical activity: No    4. Cleared for discharge by consultants (if involved): No    Discharge Planner Nurse   Safe discharge environment identified: No  Barriers to discharge: Yes       Entered by: Linda Morgan RN 04/27/2024    Vitals are Temp: 98.2  F (36.8  C) Temp src: Oral BP: 102/56 Pulse: 68   Resp: 18 SpO2: 98 %.  Patient is Alert and Oriented x4. They are 1 -2 person Assist with Gait Belt and Walker.  Patient is on Contact precuations for RSV.   and   Patient is on Droplet precuations for RSV.  Pt is on a Regular diet.  They are denying pain.  Patient is Saline locked. Duo neb given for coughing. Will cont to monitor.  Please review provider order for any additional goals.   Nurse to notify provider when observation goals have been met and patient is ready for discharge.        Problem: Adult Inpatient Plan of Care  Goal: Plan of Care Review  Description: The Plan of Care Review/Shift note should be completed every shift.  The Outcome Evaluation is a brief statement about your assessment that the patient is improving, declining, or no change.  This information will be displayed automatically on your shift  note.  4/27/2024 1434 by Linda Morgan RN  Outcome: Progressing  4/27/2024 1218 by Linda Morgan RN  Outcome: Progressing  Goal: Patient-Specific Goal (Individualized)  Description: You can add care plan individualizations to a care plan. Examples of Individualization might be:  \"Parent requests to be called daily at 9am for status\", \"I have a hard time hearing out of my right ear\", or \"Do not touch me to wake me up as it startles  me\".  4/27/2024 1434 by Linda Morgan RN  Outcome: Progressing  4/27/2024 1218 by Linda Morgan RN  Outcome: Progressing  Goal: Absence of Hospital-Acquired " Illness or Injury  4/27/2024 1434 by Linda Morgan RN  Outcome: Progressing  4/27/2024 1218 by Linda Morgan RN  Outcome: Progressing  Intervention: Identify and Manage Fall Risk  Recent Flowsheet Documentation  Taken 4/27/2024 1222 by Linda Morgan RN  Safety Promotion/Fall Prevention: activity supervised  Intervention: Prevent Skin Injury  Recent Flowsheet Documentation  Taken 4/27/2024 1222 by Linda Morgan RN  Body Position: position changed independently  Intervention: Prevent and Manage VTE (Venous Thromboembolism) Risk  Recent Flowsheet Documentation  Taken 4/27/2024 1222 by Linda Morgan RN  VTE Prevention/Management: SCDs (sequential compression devices) off  Goal: Optimal Comfort and Wellbeing  4/27/2024 1434 by Linda Morgan RN  Outcome: Progressing  4/27/2024 1218 by Linda Morgan RN  Outcome: Progressing  Goal: Readiness for Transition of Care  4/27/2024 1434 by Linda Morgan RN  Outcome: Progressing  4/27/2024 1218 by Linda Morgan RN  Outcome: Progressing     Problem: Fall Injury Risk  Goal: Absence of Fall and Fall-Related Injury  Outcome: Progressing  Intervention: Identify and Manage Contributors  Recent Flowsheet Documentation  Taken 4/27/2024 1222 by Linda Morgan RN  Medication Review/Management: medications reviewed  Intervention: Promote Injury-Free Environment  Recent Flowsheet Documentation  Taken 4/27/2024 1222 by Linda Morgan RN  Safety Promotion/Fall Prevention: activity supervised     Problem: Gas Exchange Impaired  Goal: Optimal Gas Exchange  Outcome: Progressing  Intervention: Optimize Oxygenation and Ventilation  Recent Flowsheet Documentation  Taken 4/27/2024 1222 by Linda Morgan RN  Head of Bed (HOB) Positioning: HOB at 20-30 degrees     Problem: Fatigue  Goal: Improved Activity Tolerance  Outcome: Progressing  Intervention: Promote Improved Energy  Recent Flowsheet Documentation  Taken 4/27/2024 1222 by Eddie  RONI Mckeon  Activity Management: activity adjusted per tolerance

## 2024-04-27 NOTE — H&P
[facility] Outpatient / Observation Unit  History and Physical Exam     Cathleen Ty MRN# [id number]   YOB: 1950 Age: 73 year old      Date of Admission:  4/27/2024    Primary care provider: Javid Cruz          Assessment:     Cathleen Ty is a 73 year old female with a PMH significant for HFrEF (33% on 1/2024 echo), s/p mitral clip 2020, breast CA with bone mets (managed by Dr. Stiles at , on Ibrance), DM2, BEN, fibromyalgia, depression, GERD, HTN, HLD, PN, CKD, s/p BiV pacemaker, and edema, who presents with upper respiratory infection symptoms for the past week and generalized weakness. The patient states that she has had cough, congestion and fatigue for about a week. Then last night she tried to roll out of bed to get her phone but she rolled onto the floor and could not get herself up, so she stayed there all night. She was eventually able to contact EMS for assistance and was brought to the ER.     Work up in the ED reveals: VSS, afebrile. CMP revealed Na of 135. K 3.5, BUN 21, Cr 1.16, glc 133, LFTs within normal limits. CK 72. CBC revealed wbc of 3.5 (was 5.2 on 4/18), hgb 12.2, plt 84 (was 178 on 4/18). CXR negative. RSV was positive. She was given Duonebs x 3 in the ER and will be registered to Observation for further evaluation and symptom management.     Problem List:   #. RSV  #. Generalized Weakness  Patient with 1 week of upper respiratory infection symptoms - cough, congestion, fatigue. Felt too weak to get out of bed and was on the ground for 6-7 hours before arrival. CK normal. Found to be RSV positive here.   - Has HFrEF, so will hold off on IVF for now and encourage PO   - Clinically appears stable. She does have a history of breast cancer and is on oral Ibrance. WBC is 3.5 but absolute neutrophils are within normal limits. Will monitor closely and if she decompensates then should consider ID consult for possible antiviral and IVIG  - provide symptomatic cares- cough  suppressants, tylneol, duonebs as needed   - Consider Physical Therapy consult in AM or when able based on course    #. Neutropenia  #. Thrombocytopenia  #. Breast Ca with bone mets  Follows with Dr. Stiles at [facility]. On PO Ibrance, she is off this week. WBC was 5.2 on 4/18, 3.5 on admission. Abs neut within normal limits. Monitor for now, follow up with oncology at discharge.     #. HFrEF  Has Cardiologist through . Last ECHO was 1/2024 with EF 33%. Waiting on med rec because she has duplicate med classes so unclear what she is taking but appears she is on torsemide, spironolactone, entresto.     Chronic Medical Issues:  #. DM2 - appears she is on lantus 60u. Will resume once med rec done, cover with sliding scale insulin for now  #. BNE - not using CPAP. Supplemental oxygen as needed  #. Fibromyalgia - resume cymbalta once reconciled   #. HLD - has rosuvastatin and pravastatin listed on CE. Will await med rec  #. CKD - appears baseline is about 1.1-1.3. Currently stable. Monitor.     DVT prophylaxis: pt at low risk, encourage ambulation.    Code Status: DNR, ok with trial intubation  Dispo: unclear. Lives at home in an apartment alone.              Chief Complaint:     Upper respiratory infection symptoms   Fatigue          History of Present Illness:   Cathleen Ty is a 73 year old female with a PMH significant for HFrEF (33% on 1/2024 echo), s/p mitral clip 2020, breast CA with bone mets (managed by Dr. Stiles at , on Ibrance), DM2, BEN, fibromyalgia, depression, GERD, HTN, HLD, PN, CKD, s/p BiV pacemaker, and edema, who presents with upper respiratory infection symptoms for the past week and generalized weakness. The patient states that she has had cough, congestion and fatigue for about a week. Then last night she tried to roll out of bed to get her phone but she rolled onto the floor and could not get herself up, so she stayed there all night. She was eventually able to contact EMS for assistance  and was brought to the ER.     Work up in the ED reveals: VSS, afebrile. CMP revealed Na of 135. K 3.5, BUN 21, Cr 1.16, glc 133, LFTs within normal limits. CK 72. CBC revealed wbc of 3.5 (was 5.2 on 4/18), hgb 12.2, plt 84 (was 178 on 4/18). CXR negative. RSV was positive. She was given Duonebs x 3 in the ER and will be registered to Observation for further evaluation and symptom management.               Past Medical History:     Past Medical History:   Diagnosis Date    Back pain     CHF (congestive heart failure) (H)     Hyperlipemia     Malignant neoplasm of breast (female), unspecified site 1/2012    Bilateral breast    Tubbs neuroma     L5               Past Surgical History:     Past Surgical History:   Procedure Laterality Date    APPENDECTOMY      BREAST BIOPSY, CORE RT/LT  1/2012    Bilateral    TONSILLECTOMY      TUBAL LIGATION                 Social History:     Social History     Socioeconomic History    Marital status:      Spouse name: Not on file    Number of children: Not on file    Years of education: Not on file    Highest education level: Not on file   Occupational History    Not on file   Tobacco Use    Smoking status: Never    Smokeless tobacco: Never   Substance and Sexual Activity    Alcohol use: Not on file    Drug use: No    Sexual activity: Not on file   Other Topics Concern    Not on file   Social History Narrative    Not on file     Social Determinants of Health     Financial Resource Strain: High Risk (12/22/2021)    Received from [facility]    Financial Resource Strain     Difficulty of Paying Living Expenses: Not on file     Difficulty of Paying Living Expenses: Not on file   Food Insecurity: Not on file   Transportation Needs: Not on file   Physical Activity: Not on file   Stress: Not on file   Social Connections: Unknown (12/22/2021)    Received from [facility]    Social Connections     Frequency of  Communication with Friends and Family: Not on file   Interpersonal Safety: Not on file   Housing Stability: Not on file               Family History:     Family History   Problem Relation Age of Onset    Heart Disease Mother     Heart Disease Father               Allergies:      Allergies   Allergen Reactions    Aspirin [Dihydroxyaluminum Aminoacetate] GI Disturbance    Dilaudid [Hydromorphone Hcl] Other (See Comments)     Emotional, insomnia    Lipitor [Atorvastatin Calcium]      Chest pain    Penicillins     Percocet [Oxycodone-Acetaminophen] Other (See Comments)     Emotional, insomnia    Sulfa Antibiotics Itching and Rash               Medications:     Prior to Admission medications    Medication Sig Last Dose Taking? Auth Provider Long Term End Date   aspirin (ASA) 81 MG chewable tablet Take 81 mg by mouth daily   Reported, Patient     cetirizine (ZYRTEC) 10 MG tablet Take 10 mg by mouth daily   Reported, Patient     co-enzyme Q-10 100 MG CAPS capsule Take by mouth daily   Reported, Patient     cyclobenzaprine (FLEXERIL) 10 MG tablet Take 10 mg by mouth daily as needed.   Reported, Patient     furosemide (LASIX) 40 MG tablet Take 40 mg by mouth daily   Reported, Patient     letrozole (FEMARA) 2.5 MG tablet Take 2.5 mg by mouth daily   Reported, Patient     LORAZEPAM PO    Reported, Patient     losartan (COZAAR) 25 MG tablet Take 25 mg by mouth daily   Reported, Patient Yes    Metoprolol Tartrate (LOPRESSOR PO)    Reported, Patient Yes    montelukast (SINGULAIR) 10 MG tablet Take 10 mg by mouth At Bedtime   Reported, Patient     PANTOPRAZOLE SODIUM PO Take 10 mg by mouth daily.   Reported, Patient     Pregabalin (LYRICA) 200 MG capsule Take 100 mg by mouth 3 times daily   Reported, Patient     simvastatin (ZOCOR) 40 MG tablet Take 40 mg by mouth At Bedtime.   Reported, Patient     spironolactone (ALDACTONE) 25 MG tablet Take 25 mg by mouth daily   Reported, Patient                Review of Systems:     A  "Comprehensive greater than 10 system review of systems was carried out.  Pertinent positives and negatives are noted above.  Otherwise negative for contributory information.     CONSTITUTIONAL:NEGATIVE for fever, chills, change in weight  ENT/MOUTH: POSITIVE for nasal congestion, postnasal drainage, and rhinorrhea-clear and NEGATIVE for fever, sore throat, and swollen glands  RESP:POSITIVE for cough-productive and NEGATIVE for hemoptysis, Hx asthma, and Hx COPD  CV: NEGATIVE for chest pain, palpitations or peripheral edema  : normal menstrual cycles  MUSCULOSKELETAL: POSITIVE  for myalgia and NEGATIVE for arthralgias   NEURO: NEGATIVE for weakness, dizziness or paresthesias         Physical Exam:   Blood pressure 102/56, pulse 68, temperature 98.2  F (36.8  C), temperature source Oral, resp. rate 18, height 1.524 m (5'), weight 79.3 kg (174 lb 13.2 oz), SpO2 98%.    GENERAL: healthy, alert and no distress, non-toxic appearing  EYES: Eyes grossly normal to inspection, extraocular movements - intact, and PERRL  HENT: Nose- normal; Mouth- no ulcers, no lesions.   ORAL MUCOSA: mildly dry  NECK: no tenderness, no adenopathy, no asymmetry, no masses, no stiffness; thyroid- normal to palpation  RESP: course throughout, no rales or wheezes  CV: regular rates and rhythm, normal S1 S2, no S3 or S4 and no murmur, no click or rub   ABDOMEN: soft, nontender, without hepatosplenomegaly or masses  MS: extremities- no gross deformities noted, no edema  SKIN: no suspicious lesions, no rashes  NEURO: strength and tone- normal, sensory exam- grossly normal, mentation- intact, speech- normal, reflexes- symmetric  BACK: no CVA tenderness, no paralumbar tenderness  PSYCH: Alert and oriented times 3. Affect is normal.            Data:       No results for input(s): \"PH\", \"PHV\", \"PO2\", \"PO2V\", \"SAT\", \"PCO2\", \"PCO2V\", \"HCO3\", \"HCO3V\" in the last 168 hours.  Recent Labs   Lab 04/27/24  0732   WBC 3.5*   HGB 12.2   HCT 35.4      PLT " "84*          Lab Results   Component Value Date     04/27/2024     11/13/2021    Lab Results   Component Value Date    CHLORIDE 96 04/27/2024    CHLORIDE 105 11/13/2021    Lab Results   Component Value Date    BUN 21.1 04/27/2024    BUN 25 11/13/2021      Lab Results   Component Value Date    POTASSIUM 3.5 04/27/2024    POTASSIUM 3.8 11/13/2021    Lab Results   Component Value Date    CO2 25 04/27/2024    CO2 24 11/13/2021    Lab Results   Component Value Date    CR 1.16 04/27/2024    CR 0.93 11/13/2021        7-Day Micro Results       Collected Updated Procedure Result Status      04/27/2024 0811 04/27/2024 0859 Symptomatic Influenza A/B, RSV, & SARS-CoV2 PCR (COVID-19) Nasopharyngeal [[id number]]    (Abnormal)   Swab from Nasopharyngeal    Final result Component Value   Influenza A PCR Negative   Influenza B PCR Negative   RSV PCR Positive   SARS CoV2 PCR Negative   NEGATIVE: SARS-CoV-2 (COVID-19) RNA not detected, presumed negative.                  Recent Labs   Lab 04/27/24  0732      POTASSIUM 3.5   CHLORIDE 96*   CO2 25   ANIONGAP 14   *   BUN 21.1   CR 1.16*   GFRESTIMATED 49*   BUD 8.9   PROTTOTAL 7.0   ALBUMIN 4.4   BILITOTAL 0.7   ALKPHOS 95   AST 28   ALT 19     Recent Labs   Lab 04/27/24  0732   NTBNPI 1,944*     No results for input(s): \"DD\" in the last 168 hours.  Recent Labs   Lab 04/27/24  0732   HGB 12.2     No results for input(s): \"INR\" in the last 168 hours.  Recent Labs   Lab 04/27/24  0812   LACT 1.3     No results for input(s): \"LIPASE\" in the last 168 hours.  No results for input(s): \"TSH\" in the last 168 hours.  No results for input(s): \"TROPONIN\", \"TROPI\", \"TROPR\", \"TROPONINIS\" in the last 168 hours.    Invalid input(s): \"TROPT\", \"TROP\", \"TROPONINIES\", \"TNIH\"  No results for input(s): \"COLOR\", \"APPEARANCE\", \"URINEGLC\", \"URINEBILI\", \"URINEKETONE\", \"SG\", \"UBLD\", \"URINEPH\", \"PROTEIN\", \"UROBILINOGEN\", \"NITRITE\", \"LEUKEST\", \"RBCU\", \"WBCU\" in the last 168 " hours.      Results for orders placed or performed during the hospital encounter of 04/27/24   Chest XR,  PA & LAT    Narrative    EXAM: XR CHEST 2 VIEWS  LOCATION: [facility]  DATE: 04/27/2024    INDICATION: Cough.  COMPARISON: None available.      Impression    IMPRESSION: Cardiomegaly. Apparent right lung haziness is favored artifactual/overlapping structures rather than lung parenchymal process. No convincing focal consolidation, pleural effusion or pneumothorax. Right chest wall cardiac device.         Kim Mansfield PA-C

## 2024-04-27 NOTE — PLAN OF CARE
ROOM # 222    Living Situation (if not independent, order SW consult): Home alone  Facility name: [facility]  : Meenakshi- girlfriend- [phone]/ daughter- Leola- [phone]    Activity level at baseline: Independent with cane  Activity level on admit: Assist of 1-2    Who will be transporting you at discharge: Meenakshi- monieienbette    Patient registered to observation; given Patient Bill of Rights; given the opportunity to ask questions about observation status and their plan of care.  Patient has been oriented to the observation room, bathroom and call light is in place.    Discussed discharge goals and expectations with patient/family.

## 2024-04-27 NOTE — PROGRESS NOTES
Cathleen Ty is a 73-year-old female admitted for RSV infection in the setting of hx of breast cancer and being on oral chemo. The patient is forgetful, and cannot remember which home medication she needs for tonight.  She complains of cough and heartburn.  Robitussin and Protonix ordered.  The patient appears ill and blood pressure is also soft 101/57 with HR of 110. Repeat lactate trended, up 1.3 -> 2.7. Procalcitonin normal at 0.15.  Blood cultures drawn.  Chest x-ray was negative for definitive infection.  ID consulted and since the patient is not a transplant patient, they did not recommend antiviral and IVIG.  Plan is to give the patient 500mL of LR bolus, and 100ml/hr x10 hours of maintenance fluid.

## 2024-04-27 NOTE — ED PROVIDER NOTES
History     Chief Complaint:  Fall and Generalized Weakness    HPI   Cathleen Ty is a 73 year old female with history of CHF, hyperlipidemia, and breast cancer, who presents with weakness and a fall. States that she was attempting to stand up off of a couch when she fell. Reports she was on the ground for around 6 hours before she was able to call someone. No pain with the fall, and she denies any dizziness, lightheadedness, chest pain, chest pressure, or new leg swelling. Notes that she has had 4 days of cough and congestion and has not been using any medications or breathing treatments at home. She lives alone and has a friend who visits her once a week to help with chores.     Independent Historian:   None - Patient Only    Review of External Notes:   Outpatient office visit several weeks ago: BP 70/50 without sx     Medications:    Aspirin   Co-enzyme Q  Cyclobenzaprine  Furosemide  Letrozole  Lorazepam  Losartan  Metoprolol tartrate  Montelukast   Pantoprazole  Pregabalin  Simvastatin  Spironolactone     Past Medical History:    CHF  Hyperlipidemia  Breast cancer  Tubbs neuroma     Past Surgical History:    Appendectomy  Bilateral breast biopsy  Tonsillectomy  Tubal ligation      Physical Exam   Patient Vitals for the past 24 hrs:   BP Temp Temp src Pulse Resp SpO2   04/27/24 1030 121/71 -- -- 100 -- 94 %   04/27/24 1015 98/48 -- -- 112 -- (!) 84 %   04/27/24 1000 99/69 -- -- 105 -- 96 %   04/27/24 0945 94/67 -- -- -- -- --   04/27/24 0930 99/86 -- -- 107 -- 94 %   04/27/24 0915 108/82 -- -- 101 -- 96 %   04/27/24 0900 (!) 149/86 -- -- 93 -- 98 %   04/27/24 0815 92/63 -- -- 98 -- 91 %   04/27/24 0800 101/69 -- -- 87 -- 94 %   04/27/24 0745 100/68 -- -- 94 -- 93 %   04/27/24 0730 108/71 -- -- 89 -- 96 %   04/27/24 0717 (!) 88/54 -- -- 90 -- 94 %   04/27/24 0714 (!) 88/54 98  F (36.7  C) Oral 96 18 --        Physical Exam  Constitutional: Alert, attentive, GCS 15  HENT:    Nose: Nose normal.   Eyes: EOM  are normal, anicteric, conjugate gaze  CV: regular rate and rhythm   Chest: Effort normal and breath sounds clear without wheezing or rales, symmetric bilaterally. Dry cough.  GI:  non tender. No distension. No guarding or rebound.    MSK: 2+ LE edema, no tenderness to palpation of BLE.  Neurological: Alert, attentive, moving all extremities equally.   Skin: Skin is warm and dry.     Emergency Department Course     Imaging:  Chest XR,  PA & LAT   Final Result   IMPRESSION: Cardiomegaly. Apparent right lung haziness is favored artifactual/overlapping structures rather than lung parenchymal process. No convincing focal consolidation, pleural effusion or pneumothorax. Right chest wall cardiac device.            Laboratory:  Labs Ordered and Resulted from Time of ED Arrival to Time of ED Departure   NT PROBNP INPATIENT - Abnormal       Result Value    N terminal Pro BNP Inpatient 1,944 (*)    INFLUENZA A/B, RSV, & SARS-COV2 PCR - Abnormal    Influenza A PCR Negative      Influenza B PCR Negative      RSV PCR Positive (*)     SARS CoV2 PCR Negative     COMPREHENSIVE METABOLIC PANEL - Abnormal    Sodium 135      Potassium 3.5      Carbon Dioxide (CO2) 25      Anion Gap 14      Urea Nitrogen 21.1      Creatinine 1.16 (*)     GFR Estimate 49 (*)     Calcium 8.9      Chloride 96 (*)     Glucose 133 (*)     Alkaline Phosphatase 95      AST 28      ALT 19      Protein Total 7.0      Albumin 4.4      Bilirubin Total 0.7     CBC WITH PLATELETS AND DIFFERENTIAL - Abnormal    WBC Count 3.5 (*)     RBC Count 3.54 (*)     Hemoglobin 12.2      Hematocrit 35.4            MCH 34.5 (*)     MCHC 34.5      RDW 16.3 (*)     Platelet Count 84 (*)     % Neutrophils        % Lymphocytes        % Monocytes        % Eosinophils        % Basophils        % Immature Granulocytes        NRBCs per 100 WBC 0      Absolute Neutrophils        Absolute Lymphocytes        Absolute Monocytes        Absolute Eosinophils        Absolute Basophils         Absolute Immature Granulocytes        Absolute NRBCs 0.0     DIFFERENTIAL - Abnormal    % Neutrophils 54      % Lymphocytes 38      % Monocytes 7      % Eosinophils 0      % Basophils 1      Absolute Neutrophils 1.9      Absolute Lymphocytes 1.3      Absolute Monocytes 0.2      Absolute Eosinophils 0.0      Absolute Basophils 0.0      RBC Morphology Confirmed RBC Indices      Platelet Assessment        Value: Automated Count Confirmed. Platelet morphology is normal.    Elliptocytes Slight (*)    LACTIC ACID WHOLE BLOOD - Normal    Lactic Acid 1.3     CK TOTAL - Normal    CK 72     ROUTINE UA WITH MICROSCOPIC   TYPE AND SCREEN, ADULT    ABO/RH(D) A POS      Antibody Screen Negative      SPECIMEN EXPIRATION DATE [id number]     ABO/RH TYPE AND SCREEN      Emergency Department Course & Assessments:    Interventions:  Medications   guaiFENesin-codeine (ROBITUSSIN AC) 100-10 MG/5ML solution 5 mL (5 mLs Oral $Given 24 1037)   ipratropium - albuterol 0.5 mg/2.5 mg/3 mL (DUONEB) neb solution 3 mL (3 mLs Nebulization $Given 24 0857)   lidocaine inhalant 4% nebulizer solution 3 mL (3 mLs Nebulization $Given 24 1020)      Assessments:  0734 I entered the patient's room and obtained history.  1011 I rechecked the patient and explained findings.    Independent Interpretation (X-rays, CTs, rhythm strip):  I personally viewed her chest x-ray, see no evidence of pneumothorax.    Consultations/Discussion of Management or Tests:  1051    I consulted with Kim Mansfield PA-C, for Dr. Samano, hospitalist, regarding the patient's history and presentation here in the emergency department.        Social Determinants of Health affecting care:   None    Disposition:  The patient was admitted to the hospital under the care of Dr. Samano.     Impression & Plan    CMS Diagnoses: None       Medical Decision Makin-year-old woman past medical history seen for systolic heart failure, chronic hypotension, tobacco use  disorder presenting from home for generalized weakness, unable to get up for 7 hours, no reported trauma.  She was noted to be hypotensive here however lactate within normal limits, I did avoid aggressive fluid resuscitation given low suspicion for hypovolemia in setting of chronic hypotension.  She endorses a viral symptoms over the last weeks, RSV testing here was positive, chest x-ray is without pneumonia.  I did give her several DuoNebs for her cough though I did not hear any overt wheezing with lower suspicion for potential COPD exacerbation.  She also got a lidocaine neb without improvement in her cough.  Due to her generalized weakness in the setting of RSV, we will plan for observation admission.    Diagnosis:    ICD-10-CM    1. RSV infection  B33.8       2. Generalized weakness  R53.1            Fabrizio Coburn MD  [employer]  11:24 AM 04/27/24       Scribe Disclosure:  BENNIE Zaida Hired, am serving as a scribe at 7:31 AM on 4/27/2024 to document services personally performed by Fabrizio Coburn MD based on my observations and the provider's statements to me.   4/27/2024   Fabrizio Coburn MD Dunbar, John Forrest, MD  04/27/24 1124

## 2024-04-27 NOTE — PHARMACY-ADMISSION MEDICATION HISTORY
Pharmacist Admission Medication History    Admission medication history is complete. The information provided in this note is only as accurate as the sources available at the time of the update.    Information Source(s): Patient and CareEverywhere/SureScripts via in-person  Pertinent Information: none    Changes made to PTA medication list:  Added: torsemide, olanzapine, rosuvastatin, finasteride, acylclovir, duloxetine  Deleted: simvastatin, losartan, metoprolol, furosemide  Changed: spironolactone (25 mg --> 12.5 mg), pregabalin (TID --> BID), pantoprazole (10 mg --> 40 mg)    Allergies reviewed with patient and updates made in EHR: no  Medication History Completed By: Armani Helton [facility] 4/27/2024 3:43 PM    Prior to Admission medications    Medication Sig Last Dose Taking? Auth Provider Long Term End Date   acyclovir (ZOVIRAX) 400 MG tablet Take 400 mg by mouth daily Past Week Yes Unknown, Entered By History Yes    aspirin (ASA) 81 MG chewable tablet Take 81 mg by mouth daily Past Week Yes Reported, Patient     cetirizine (ZYRTEC) 10 MG tablet Take 10 mg by mouth daily Past Week Yes Reported, Patient     cyclobenzaprine (FLEXERIL) 10 MG tablet Take 10 mg by mouth daily as needed. Past Week Yes Reported, Patient     DULoxetine (CYMBALTA) 30 MG capsule Take 30 mg by mouth daily Past Week Yes Unknown, Entered By History Yes    finasteride (PROSCAR) 5 MG tablet Take 2.5 mg by mouth daily Past Week Yes Unknown, Entered By History     furosemide (LASIX) 40 MG tablet Take 40 mg by mouth daily Past Week Yes Reported, Patient     letrozole (FEMARA) 2.5 MG tablet Take 2.5 mg by mouth daily Past Week Yes Reported, Patient     LORazepam (ATIVAN) 0.5 MG tablet Take 0.5-1 mg by mouth nightly as needed for anxiety Past Month Yes Unknown, Entered By History     montelukast (SINGULAIR) 10 MG tablet Take 10 mg by mouth At Bedtime Past Week Yes Reported, Patient     OLANZapine (ZYPREXA) 5 MG tablet Take 5-10 mg by mouth at  bedtime Past Week Yes Unknown, Entered By History Yes    pantoprazole (PROTONIX) 40 MG EC tablet Take 40 mg by mouth daily Past Week Yes Unknown, Entered By History     Pregabalin (LYRICA) 200 MG capsule Take 200 mg by mouth 2 times daily Past Week Yes Unknown, Entered By History     rosuvastatin (CRESTOR) 20 MG tablet Take 20 mg by mouth at bedtime Past Week Yes Unknown, Entered By History Yes    spironolactone (ALDACTONE) 25 MG tablet Take 12.5 mg by mouth daily Past Week Yes Unknown, Entered By History     torsemide (DEMADEX) 20 MG tablet Take 60 mg by mouth daily Past Week Yes Unknown, Entered By History Yes    co-enzyme Q-10 100 MG CAPS capsule Take by mouth daily   Reported, Patient

## 2024-04-27 NOTE — ED NOTES
[facility] [place]  ED Nurse Handoff Report    ED Chief complaint: Fall and Generalized Weakness  . ED Diagnosis:   Final diagnoses:   RSV infection   Generalized weakness       Allergies:   Allergies   Allergen Reactions    Aspirin [Dihydroxyaluminum Aminoacetate] GI Disturbance    Dilaudid [Hydromorphone Hcl] Other (See Comments)     Emotional, insomnia    Lipitor [Atorvastatin Calcium]      Chest pain    Penicillins     Percocet [Oxycodone-Acetaminophen] Other (See Comments)     Emotional, insomnia    Sulfa Antibiotics Itching and Rash       Code Status: See orders    Activity level - Baseline/Home:  independent.  Activity Level - Current:   standby and assist of 1.   Lift room needed: No.   Bariatric: No   Needed: No   Isolation: Yes.   Infection: Not Applicable  Other . RSV    Respiratory status: Room air    Vital Signs (within 30 minutes):   Vitals:    04/27/24 0945 04/27/24 1000 04/27/24 1015 04/27/24 1030   BP: 94/67 99/69 98/48 121/71   Pulse:  105 112 100   Resp:       Temp:       TempSrc:       SpO2:  96% (!) 84% 94%       Cardiac Rhythm:  ,      Pain level:    Patient confused: No.   Patient Falls Risk: activity supervised.   Elimination Status: Due to void     Patient Report - Initial Complaint: Fall, generalized weakness.   Focused Assessment: Cathleen Ty is a 73 year old female with history of CHF, hyperlipidemia, and breast cancer, who presents with weakness and a fall. States that she was attempting to stand up off of a couch when she fell. Reports she was on the ground for around 6 hours before she was able to call someone. No pain with the fall, and she denies any dizziness, lightheadedness, chest pain, chest pressure, or new leg swelling. Notes that she has had 4 days of cough and congestion and has not been using any medications or breathing treatments at home. She lives alone and has a friend who visits her once a week to help with chores.      Abnormal Results:    Labs Ordered and Resulted from Time of ED Arrival to Time of ED Departure   NT PROBNP INPATIENT - Abnormal       Result Value    N terminal Pro BNP Inpatient 1,944 (*)    INFLUENZA A/B, RSV, & SARS-COV2 PCR - Abnormal    Influenza A PCR Negative      Influenza B PCR Negative      RSV PCR Positive (*)     SARS CoV2 PCR Negative     COMPREHENSIVE METABOLIC PANEL - Abnormal    Sodium 135      Potassium 3.5      Carbon Dioxide (CO2) 25      Anion Gap 14      Urea Nitrogen 21.1      Creatinine 1.16 (*)     GFR Estimate 49 (*)     Calcium 8.9      Chloride 96 (*)     Glucose 133 (*)     Alkaline Phosphatase 95      AST 28      ALT 19      Protein Total 7.0      Albumin 4.4      Bilirubin Total 0.7     CBC WITH PLATELETS AND DIFFERENTIAL - Abnormal    WBC Count 3.5 (*)     RBC Count 3.54 (*)     Hemoglobin 12.2      Hematocrit 35.4            MCH 34.5 (*)     MCHC 34.5      RDW 16.3 (*)     Platelet Count 84 (*)     % Neutrophils        % Lymphocytes        % Monocytes        % Eosinophils        % Basophils        % Immature Granulocytes        NRBCs per 100 WBC 0      Absolute Neutrophils        Absolute Lymphocytes        Absolute Monocytes        Absolute Eosinophils        Absolute Basophils        Absolute Immature Granulocytes        Absolute NRBCs 0.0     DIFFERENTIAL - Abnormal    % Neutrophils 54      % Lymphocytes 38      % Monocytes 7      % Eosinophils 0      % Basophils 1      Absolute Neutrophils 1.9      Absolute Lymphocytes 1.3      Absolute Monocytes 0.2      Absolute Eosinophils 0.0      Absolute Basophils 0.0      RBC Morphology Confirmed RBC Indices      Platelet Assessment        Value: Automated Count Confirmed. Platelet morphology is normal.    Elliptocytes Slight (*)    LACTIC ACID WHOLE BLOOD - Normal    Lactic Acid 1.3     CK TOTAL - Normal    CK 72     ROUTINE UA WITH MICROSCOPIC   TYPE AND SCREEN, ADULT    ABO/RH(D) A POS      Antibody Screen Negative      SPECIMEN EXPIRATION DATE  [id number]     ABO/RH TYPE AND SCREEN        Chest XR,  PA & LAT   Final Result   IMPRESSION: Cardiomegaly. Apparent right lung haziness is favored artifactual/overlapping structures rather than lung parenchymal process. No convincing focal consolidation, pleural effusion or pneumothorax. Right chest wall cardiac device.             Treatments provided: See eMAR  Family Comments: N/A at this time  OBS brochure/video discussed/provided to patient:  Yes  ED Medications:   Medications   guaiFENesin-codeine (ROBITUSSIN AC) 100-10 MG/5ML solution 5 mL (5 mLs Oral $Given 4/27/24 1037)   ipratropium - albuterol 0.5 mg/2.5 mg/3 mL (DUONEB) neb solution 3 mL (3 mLs Nebulization $Given 4/27/24 0857)   lidocaine inhalant 4% nebulizer solution 3 mL (3 mLs Nebulization $Given 4/27/24 1020)       Drips infusing:  No  For the majority of the shift this patient was Green.   Interventions performed were See eMAR.    Sepsis treatment initiated: No    Cares/treatment/interventions/medications to be completed following ED care: N/A at this time    ED Nurse Name: Chel Rodriguez RN  11:09 AM    RECEIVING UNIT ED HANDOFF REVIEW    Above ED Nurse Handoff Report was reviewed: Yes  Reviewed by: Linda Morgan RN on April 27, 2024 at 11:26 AM   BENNIE Linares called the ED to inform them the note was read: Yes

## 2024-04-27 NOTE — ED TRIAGE NOTES
Pt rolled off of couch onto floor (approx 1.5 ft) and was unable to get up. Was down on floor for 6-7 hours before EMS arrived. C/o cough for x3-4 days, pt reports frequent hx of bronchitis. Hx of DM2, EMS .      Triage Assessment (Adult)       Row Name 04/27/24 0717          Triage Assessment    Airway WDL WDL        Respiratory WDL    Respiratory WDL WDL        Skin Circulation/Temperature WDL    Skin Circulation/Temperature WDL WDL        Cardiac WDL    Cardiac WDL WDL        Peripheral/Neurovascular WDL    Peripheral Neurovascular WDL WDL        Cognitive/Neuro/Behavioral WDL    Cognitive/Neuro/Behavioral WDL WDL

## 2024-04-28 LAB
ANION GAP SERPL CALCULATED.3IONS-SCNC: 14 MMOL/L (ref 7–15)
BASOPHILS # BLD AUTO: ABNORMAL 10*3/UL
BASOPHILS # BLD MANUAL: 0 10E3/UL (ref 0–0.2)
BASOPHILS NFR BLD AUTO: ABNORMAL %
BASOPHILS NFR BLD MANUAL: 0 %
BUN SERPL-MCNC: 17.7 MG/DL (ref 8–23)
CALCIUM SERPL-MCNC: 9 MG/DL (ref 8.8–10.2)
CHLORIDE SERPL-SCNC: 105 MMOL/L (ref 98–107)
CREAT SERPL-MCNC: 0.92 MG/DL (ref 0.51–0.95)
DEPRECATED HCO3 PLAS-SCNC: 23 MMOL/L (ref 22–29)
EGFRCR SERPLBLD CKD-EPI 2021: 65 ML/MIN/1.73M2
ELLIPTOCYTES BLD QL SMEAR: SLIGHT
EOSINOPHIL # BLD AUTO: ABNORMAL 10*3/UL
EOSINOPHIL # BLD MANUAL: 0 10E3/UL (ref 0–0.7)
EOSINOPHIL NFR BLD AUTO: ABNORMAL %
EOSINOPHIL NFR BLD MANUAL: 0 %
ERYTHROCYTE [DISTWIDTH] IN BLOOD BY AUTOMATED COUNT: 16.8 % (ref 10–15)
GLUCOSE BLDC GLUCOMTR-MCNC: 112 MG/DL (ref 70–99)
GLUCOSE BLDC GLUCOMTR-MCNC: 119 MG/DL (ref 70–99)
GLUCOSE BLDC GLUCOMTR-MCNC: 121 MG/DL (ref 70–99)
GLUCOSE BLDC GLUCOMTR-MCNC: 125 MG/DL (ref 70–99)
GLUCOSE BLDC GLUCOMTR-MCNC: 126 MG/DL (ref 70–99)
GLUCOSE SERPL-MCNC: 122 MG/DL (ref 70–99)
HCT VFR BLD AUTO: 28.8 % (ref 35–47)
HGB BLD-MCNC: 9.8 G/DL (ref 11.7–15.7)
IMM GRANULOCYTES # BLD: ABNORMAL 10*3/UL
IMM GRANULOCYTES NFR BLD: ABNORMAL %
LYMPHOCYTES # BLD AUTO: ABNORMAL 10*3/UL
LYMPHOCYTES # BLD MANUAL: 1.5 10E3/UL (ref 0.8–5.3)
LYMPHOCYTES NFR BLD AUTO: ABNORMAL %
LYMPHOCYTES NFR BLD MANUAL: 42 %
MCH RBC QN AUTO: 34.3 PG (ref 26.5–33)
MCHC RBC AUTO-ENTMCNC: 34 G/DL (ref 31.5–36.5)
MCV RBC AUTO: 101 FL (ref 78–100)
MONOCYTES # BLD AUTO: ABNORMAL 10*3/UL
MONOCYTES # BLD MANUAL: 0.3 10E3/UL (ref 0–1.3)
MONOCYTES NFR BLD AUTO: ABNORMAL %
MONOCYTES NFR BLD MANUAL: 7 %
NEUTROPHILS # BLD AUTO: ABNORMAL 10*3/UL
NEUTROPHILS # BLD MANUAL: 1.8 10E3/UL (ref 1.6–8.3)
NEUTROPHILS NFR BLD AUTO: ABNORMAL %
NEUTROPHILS NFR BLD MANUAL: 51 %
NRBC # BLD AUTO: 0 10E3/UL
NRBC BLD AUTO-RTO: 0 /100
PLAT MORPH BLD: ABNORMAL
PLATELET # BLD AUTO: 60 10E3/UL (ref 150–450)
POTASSIUM SERPL-SCNC: 3.7 MMOL/L (ref 3.4–5.3)
RBC # BLD AUTO: 2.86 10E6/UL (ref 3.8–5.2)
RBC MORPH BLD: ABNORMAL
SODIUM SERPL-SCNC: 142 MMOL/L (ref 135–145)
WBC # BLD AUTO: 3.6 10E3/UL (ref 4–11)

## 2024-04-28 PROCEDURE — 250N000009 HC RX 250: Performed by: PHYSICIAN ASSISTANT

## 2024-04-28 PROCEDURE — 36415 COLL VENOUS BLD VENIPUNCTURE: CPT | Performed by: PHYSICIAN ASSISTANT

## 2024-04-28 PROCEDURE — 82962 GLUCOSE BLOOD TEST: CPT

## 2024-04-28 PROCEDURE — 85027 COMPLETE CBC AUTOMATED: CPT | Performed by: PHYSICIAN ASSISTANT

## 2024-04-28 PROCEDURE — 99232 SBSQ HOSP IP/OBS MODERATE 35: CPT | Performed by: INTERNAL MEDICINE

## 2024-04-28 PROCEDURE — 250N000013 HC RX MED GY IP 250 OP 250 PS 637: Performed by: INTERNAL MEDICINE

## 2024-04-28 PROCEDURE — 250N000013 HC RX MED GY IP 250 OP 250 PS 637: Performed by: STUDENT IN AN ORGANIZED HEALTH CARE EDUCATION/TRAINING PROGRAM

## 2024-04-28 PROCEDURE — 250N000013 HC RX MED GY IP 250 OP 250 PS 637: Performed by: NURSE PRACTITIONER

## 2024-04-28 PROCEDURE — 80048 BASIC METABOLIC PNL TOTAL CA: CPT | Performed by: PHYSICIAN ASSISTANT

## 2024-04-28 PROCEDURE — 85007 BL SMEAR W/DIFF WBC COUNT: CPT | Performed by: PHYSICIAN ASSISTANT

## 2024-04-28 PROCEDURE — 96361 HYDRATE IV INFUSION ADD-ON: CPT

## 2024-04-28 PROCEDURE — G0378 HOSPITAL OBSERVATION PER HR: HCPCS

## 2024-04-28 PROCEDURE — 99203 OFFICE O/P NEW LOW 30 MIN: CPT | Performed by: INTERNAL MEDICINE

## 2024-04-28 PROCEDURE — 250N000013 HC RX MED GY IP 250 OP 250 PS 637: Performed by: PHYSICIAN ASSISTANT

## 2024-04-28 RX ORDER — DULOXETIN HYDROCHLORIDE 30 MG/1
30 CAPSULE, DELAYED RELEASE ORAL DAILY
Status: DISCONTINUED | OUTPATIENT
Start: 2024-04-28 | End: 2024-05-01 | Stop reason: HOSPADM

## 2024-04-28 RX ORDER — PREGABALIN 75 MG/1
150 CAPSULE ORAL 2 TIMES DAILY
Status: DISCONTINUED | OUTPATIENT
Start: 2024-04-28 | End: 2024-05-01 | Stop reason: HOSPADM

## 2024-04-28 RX ORDER — PANTOPRAZOLE SODIUM 40 MG/1
40 TABLET, DELAYED RELEASE ORAL DAILY
Status: DISCONTINUED | OUTPATIENT
Start: 2024-04-28 | End: 2024-05-01 | Stop reason: HOSPADM

## 2024-04-28 RX ORDER — MONTELUKAST SODIUM 10 MG/1
10 TABLET ORAL AT BEDTIME
Status: DISCONTINUED | OUTPATIENT
Start: 2024-04-28 | End: 2024-05-01 | Stop reason: HOSPADM

## 2024-04-28 RX ORDER — ROSUVASTATIN CALCIUM 10 MG/1
20 TABLET, COATED ORAL AT BEDTIME
Status: DISCONTINUED | OUTPATIENT
Start: 2024-04-28 | End: 2024-05-01 | Stop reason: HOSPADM

## 2024-04-28 RX ORDER — LORAZEPAM 0.5 MG/1
.5-1 TABLET ORAL
Status: DISCONTINUED | OUTPATIENT
Start: 2024-04-28 | End: 2024-04-29

## 2024-04-28 RX ORDER — CETIRIZINE HYDROCHLORIDE 10 MG/1
10 TABLET ORAL DAILY
Status: DISCONTINUED | OUTPATIENT
Start: 2024-04-28 | End: 2024-05-01 | Stop reason: HOSPADM

## 2024-04-28 RX ORDER — PREGABALIN 100 MG/1
200 CAPSULE ORAL 2 TIMES DAILY
Status: DISCONTINUED | OUTPATIENT
Start: 2024-04-28 | End: 2024-04-28

## 2024-04-28 RX ORDER — LETROZOLE 2.5 MG/1
2.5 TABLET, FILM COATED ORAL DAILY
Status: DISCONTINUED | OUTPATIENT
Start: 2024-04-28 | End: 2024-05-01 | Stop reason: HOSPADM

## 2024-04-28 RX ORDER — ACYCLOVIR 400 MG/1
400 TABLET ORAL DAILY
Status: DISCONTINUED | OUTPATIENT
Start: 2024-04-28 | End: 2024-05-01 | Stop reason: HOSPADM

## 2024-04-28 RX ORDER — FINASTERIDE 5 MG/1
2.5 TABLET, FILM COATED ORAL DAILY
Status: DISCONTINUED | OUTPATIENT
Start: 2024-04-28 | End: 2024-05-01 | Stop reason: HOSPADM

## 2024-04-28 RX ORDER — OLANZAPINE 5 MG/1
5-10 TABLET ORAL AT BEDTIME
Status: DISCONTINUED | OUTPATIENT
Start: 2024-04-28 | End: 2024-05-01 | Stop reason: HOSPADM

## 2024-04-28 RX ADMIN — GUAIFENESIN SYRUP AND DEXTROMETHORPHAN 10 ML: 100; 10 SYRUP ORAL at 17:26

## 2024-04-28 RX ADMIN — BENZONATATE 100 MG: 100 CAPSULE ORAL at 17:26

## 2024-04-28 RX ADMIN — GUAIFENESIN SYRUP AND DEXTROMETHORPHAN 10 ML: 100; 10 SYRUP ORAL at 04:40

## 2024-04-28 RX ADMIN — GUAIFENESIN SYRUP AND DEXTROMETHORPHAN 10 ML: 100; 10 SYRUP ORAL at 21:57

## 2024-04-28 RX ADMIN — ACETAMINOPHEN 650 MG: 325 TABLET, FILM COATED ORAL at 06:05

## 2024-04-28 RX ADMIN — ACYCLOVIR 400 MG: 400 TABLET ORAL at 10:31

## 2024-04-28 RX ADMIN — PANTOPRAZOLE SODIUM 40 MG: 40 TABLET, DELAYED RELEASE ORAL at 08:21

## 2024-04-28 RX ADMIN — Medication 1 LOZENGE: at 12:38

## 2024-04-28 RX ADMIN — DULOXETINE HYDROCHLORIDE 30 MG: 30 CAPSULE, DELAYED RELEASE ORAL at 10:30

## 2024-04-28 RX ADMIN — BENZONATATE 100 MG: 100 CAPSULE ORAL at 22:40

## 2024-04-28 RX ADMIN — BENZONATATE 100 MG: 100 CAPSULE ORAL at 01:38

## 2024-04-28 RX ADMIN — ACETAMINOPHEN 650 MG: 325 TABLET, FILM COATED ORAL at 17:26

## 2024-04-28 RX ADMIN — PREGABALIN 150 MG: 75 CAPSULE ORAL at 21:03

## 2024-04-28 RX ADMIN — IPRATROPIUM BROMIDE AND ALBUTEROL SULFATE 3 ML: .5; 3 SOLUTION RESPIRATORY (INHALATION) at 23:02

## 2024-04-28 RX ADMIN — ACETAMINOPHEN 650 MG: 325 TABLET, FILM COATED ORAL at 21:57

## 2024-04-28 RX ADMIN — ROSUVASTATIN 20 MG: 10 TABLET, FILM COATED ORAL at 21:03

## 2024-04-28 RX ADMIN — MONTELUKAST 10 MG: 10 TABLET, FILM COATED ORAL at 21:04

## 2024-04-28 RX ADMIN — GUAIFENESIN SYRUP AND DEXTROMETHORPHAN 10 ML: 100; 10 SYRUP ORAL at 08:22

## 2024-04-28 RX ADMIN — ACETAMINOPHEN 650 MG: 325 TABLET, FILM COATED ORAL at 01:38

## 2024-04-28 RX ADMIN — OLANZAPINE 10 MG: 5 TABLET, FILM COATED ORAL at 21:04

## 2024-04-28 RX ADMIN — LETROZOLE 2.5 MG: 2.5 TABLET ORAL at 10:30

## 2024-04-28 RX ADMIN — PREGABALIN 200 MG: 100 CAPSULE ORAL at 10:30

## 2024-04-28 RX ADMIN — LORAZEPAM 0.5 MG: 0.5 TABLET ORAL at 21:58

## 2024-04-28 RX ADMIN — ACETAMINOPHEN 650 MG: 325 TABLET, FILM COATED ORAL at 10:29

## 2024-04-28 RX ADMIN — FINASTERIDE 2.5 MG: 5 TABLET, FILM COATED ORAL at 10:30

## 2024-04-28 RX ADMIN — CETIRIZINE HYDROCHLORIDE 10 MG: 10 TABLET, FILM COATED ORAL at 12:38

## 2024-04-28 ASSESSMENT — ACTIVITIES OF DAILY LIVING (ADL)
ADLS_ACUITY_SCORE: 46
ADLS_ACUITY_SCORE: 47
ADLS_ACUITY_SCORE: 46
ADLS_ACUITY_SCORE: 46
ADLS_ACUITY_SCORE: 47
ADLS_ACUITY_SCORE: 47
ADLS_ACUITY_SCORE: 46
ADLS_ACUITY_SCORE: 47
ADLS_ACUITY_SCORE: 46
ADLS_ACUITY_SCORE: 47
ADLS_ACUITY_SCORE: 47
ADLS_ACUITY_SCORE: 46
ADLS_ACUITY_SCORE: 47
ADLS_ACUITY_SCORE: 47
ADLS_ACUITY_SCORE: 46
ADLS_ACUITY_SCORE: 46
ADLS_ACUITY_SCORE: 47
ADLS_ACUITY_SCORE: 46
ADLS_ACUITY_SCORE: 47

## 2024-04-28 NOTE — PLAN OF CARE
"PRIMARY DIAGNOSIS: RSV  OUTPATIENT/OBSERVATION GOALS TO BE MET BEFORE DISCHARGE:  Dyspnea improved and O2 sats >88% on RA or back to baseline O2 levels: Yes   SpO2: 94 %, O2 Device: None (Room air)    Tolerating oral abx or appropriate plans made outpatient infusion:  NA    Vitals signs normal or return to baseline: BP 98/53 (BP Location: Right arm)   Pulse 91   Temp 98  F (36.7  C) (Oral)   Resp 20   Ht 1.524 m (5')   Wt 79.9 kg (176 lb 1.6 oz)   SpO2 95%   BMI 34.39 kg/m      Short term supplemental O2 needed with activity at home: No    Tolerate oral intake to maintain hydration: Yes    Return to near baseline physical activity: Yes    Discharge Planner Nurse   Safe discharge environment identified: Yes  Barriers to discharge: No       Entered by: Rehana Davidson RN 04/28/2024 4:00PM     Please review provider order for any additional goals.   Nurse to notify provider when observation goals have been met and patient is ready for discharge.    Goal Outcome Evaluation:      Plan of Care Reviewed With: patient    Overall Patient Progress: no changeOverall Patient Progress: no change    Outcome Evaluation: Frequent coughing, low BP.  Pain from coughing. 94% RA      Problem: Adult Inpatient Plan of Care  Goal: Plan of Care Review  Description: The Plan of Care Review/Shift note should be completed every shift.  The Outcome Evaluation is a brief statement about your assessment that the patient is improving, declining, or no change.  This information will be displayed automatically on your shift  note.  Outcome: Progressing  Flowsheets (Taken 4/28/2024 0924)  Outcome Evaluation: Frequent coughing, low BP.  Pain from coughing. 94% RA  Plan of Care Reviewed With: patient  Overall Patient Progress: no change  Goal: Patient-Specific Goal (Individualized)  Description: You can add care plan individualizations to a care plan. Examples of Individualization might be:  \"Parent requests to be called daily at 9am for " "status\", \"I have a hard time hearing out of my right ear\", or \"Do not touch me to wake me up as it startles  me\".  Outcome: Progressing  Goal: Absence of Hospital-Acquired Illness or Injury  Outcome: Progressing  Goal: Optimal Comfort and Wellbeing  Outcome: Progressing  Goal: Readiness for Transition of Care  Outcome: Progressing     "

## 2024-04-28 NOTE — CONSULTS
[facility]    Infectious Disease Consultation     Date of Admission:  4/27/2024  Date of Consult (When I saw the patient): 04/28/24    Assessment & Plan   Cathleen Ty is a 73 year old who was admitted on 4/27/2024.     Impression:  73-year-old with breast cancer with bone mets on Ibrance   Also history of heart failure  Diabetes  Pacemaker in place  Hypertension, CKD  Admitted with cough congestion and generalized weakness  ongoing for about a week  Tested positive for RSV  No fevers, no hypoxia says she feels slightly better than at admission       Recommendations   Supportive treatment as in place              Lino Hastings MD    Reason for Consult   Reason for consult: I was asked to evaluate this patient for RSV .    Primary Care Physician   Javid Cruz    Chief Complaint   Generalized weakness     History is obtained from the patient and medical records    History of Present Illness   Cathleen Ty is a 73 year old female who presents with cough and generalized fatigue.  Breast cancer patient tested positive for RSV    Past Medical History   I have reviewed this patient's medical history and updated it with pertinent information if needed.   Past Medical History:   Diagnosis Date    Back pain     CHF (congestive heart failure) (H)     Hyperlipemia     Malignant neoplasm of breast (female), unspecified site 1/2012    Bilateral breast    Tubbs neuroma     L5       Past Surgical History   I have reviewed this patient's surgical history and updated it with pertinent information if needed.  Past Surgical History:   Procedure Laterality Date    APPENDECTOMY      BREAST BIOPSY, CORE RT/LT  1/2012    Bilateral    TONSILLECTOMY      TUBAL LIGATION         Prior to Admission Medications   Prior to Admission Medications   Prescriptions Last Dose Informant Patient Reported? Taking?   DULoxetine (CYMBALTA) 30 MG capsule Past Week  Yes Yes   Sig: Take 30 mg by mouth daily   LORazepam  (ATIVAN) 0.5 MG tablet Past Month  Yes Yes   Sig: Take 0.5-1 mg by mouth nightly as needed for anxiety   OLANZapine (ZYPREXA) 5 MG tablet Past Week  Yes Yes   Sig: Take 5-10 mg by mouth at bedtime   Pregabalin (LYRICA) 200 MG capsule Past Week  Yes Yes   Sig: Take 200 mg by mouth 2 times daily   acyclovir (ZOVIRAX) 400 MG tablet Past Week  Yes Yes   Sig: Take 400 mg by mouth daily   aspirin (ASA) 81 MG chewable tablet Past Week  Yes Yes   Sig: Take 81 mg by mouth daily   cetirizine (ZYRTEC) 10 MG tablet Past Week  Yes Yes   Sig: Take 10 mg by mouth daily   co-enzyme Q-10 100 MG CAPS capsule   Yes No   Sig: Take by mouth daily   cyclobenzaprine (FLEXERIL) 10 MG tablet Past Week  Yes Yes   Sig: Take 10 mg by mouth daily as needed.   finasteride (PROSCAR) 5 MG tablet Past Week  Yes Yes   Sig: Take 2.5 mg by mouth daily   furosemide (LASIX) 40 MG tablet Past Week  Yes Yes   Sig: Take 40 mg by mouth daily   letrozole (FEMARA) 2.5 MG tablet Past Week  Yes Yes   Sig: Take 2.5 mg by mouth daily   montelukast (SINGULAIR) 10 MG tablet Past Week  Yes Yes   Sig: Take 10 mg by mouth At Bedtime   pantoprazole (PROTONIX) 40 MG EC tablet Past Week  Yes Yes   Sig: Take 40 mg by mouth daily   rosuvastatin (CRESTOR) 20 MG tablet Past Week  Yes Yes   Sig: Take 20 mg by mouth at bedtime   spironolactone (ALDACTONE) 25 MG tablet Past Week  Yes Yes   Sig: Take 12.5 mg by mouth daily   torsemide (DEMADEX) 20 MG tablet Past Week  Yes Yes   Sig: Take 60 mg by mouth daily      Facility-Administered Medications: None     Allergies   Allergies   Allergen Reactions    Aspirin [Dihydroxyaluminum Aminoacetate] GI Disturbance    Dilaudid [Hydromorphone Hcl] Other (See Comments)     Emotional, insomnia    Lipitor [Atorvastatin Calcium]      Chest pain    Penicillins     Percocet [Oxycodone-Acetaminophen] Other (See Comments)     Emotional, insomnia    Sulfa Antibiotics Itching and Rash       Immunization History   Immunization History   Administered  "Date(s) Administered    COVID-19 12+ (2023-24) (Pfizer) 11/03/2023    COVID-19 Bivalent 12+ (Pfizer) 11/18/2022    COVID-19 Monovalent 18+ (Moderna) 03/03/2021, 03/31/2021, 12/22/2021, 05/09/2022       Social History   I have reviewed this patient's social history and updated it with pertinent information if needed. Cathleen Ty  reports that she has never smoked. She has never used smokeless tobacco. She reports that she does not use drugs.    Family History   I have reviewed this patient's family history and updated it with pertinent information if needed.   Family History   Problem Relation Age of Onset    Heart Disease Mother     Heart Disease Father        Review of Systems   The 10 point Review of Systems is negative    Physical Exam   Temp: 97.6  F (36.4  C) Temp src: Oral BP: 103/65 Pulse: 99   Resp: 18 SpO2: 94 % O2 Device: None (Room air)    Vital Signs with Ranges  Temp:  [97.6  F (36.4  C)-99.3  F (37.4  C)] 97.6  F (36.4  C)  Pulse:  [] 99  Resp:  [18-20] 18  BP: ()/(46-65) 103/65  SpO2:  [91 %-98 %] 94 %  176 lbs 1.6 oz  Body mass index is 34.39 kg/m .    GENERAL APPEARANCE:  awake  EYES: Eyes grossly normal to inspection  NECK: no adenopathy  RESP: lungs clear   CV: regular rates and rhythm  LYMPHATICS: normal ant/post cervical and supraclavicular nodes  ABDOMEN: soft, nontender  MS: extremities normal  SKIN: no suspicious lesions or rashes        Data   All laboratory and imaging data in the past 24 hours reviewed  No results for input(s): \"CULT\" in the last 168 hours.  No lab results found.    Invalid input(s): \"UC\"       All cultures:  Recent Labs   Lab 04/27/24  1725 04/27/24  1715   CULTURE No growth after 12 hours No growth after 12 hours      Blood culture:  Results for orders placed or performed during the hospital encounter of 04/27/24   Blood Culture Arm, Right    Specimen: Arm, Right; Blood   Result Value Ref Range    Culture No growth after 12 hours    Blood Culture Hand, " Right    Specimen: Hand, Right; Blood   Result Value Ref Range    Culture No growth after 12 hours       Urine culture:  No results found for this or any previous visit.

## 2024-04-28 NOTE — PLAN OF CARE
"PRIMARY DIAGNOSIS: RSV  OUTPATIENT/OBSERVATION GOALS TO BE MET BEFORE DISCHARGE:  Dyspnea improved and O2 sats >88% on RA or back to baseline O2 levels: Yes   SpO2: 94 %, O2 Device: None (Room air)    Tolerating oral abx or appropriate plans made outpatient infusion:  NA    Vitals signs normal or return to baseline: BP (!) 87/59 (BP Location: Right arm)   Pulse 89   Temp 97.3  F (36.3  C) (Oral)   Resp 20   Ht 1.524 m (5')   Wt 79.9 kg (176 lb 1.6 oz)   SpO2 96%   BMI 34.39 kg/m      Short term supplemental O2 needed with activity at home: No    Tolerate oral intake to maintain hydration: Yes    Return to near baseline physical activity: Yes    Discharge Planner Nurse   Safe discharge environment identified: Yes  Barriers to discharge: No       Entered by: Rehana Davidson RN 04/28/2024 12:00PM     Please review provider order for any additional goals.   Nurse to notify provider when observation goals have been met and patient is ready for discharge.    Goal Outcome Evaluation:      Plan of Care Reviewed With: patient    Overall Patient Progress: no changeOverall Patient Progress: no change    Outcome Evaluation: Frequent coughing, low BP.  Pain from coughing. 94% RA      Problem: Adult Inpatient Plan of Care  Goal: Plan of Care Review  Description: The Plan of Care Review/Shift note should be completed every shift.  The Outcome Evaluation is a brief statement about your assessment that the patient is improving, declining, or no change.  This information will be displayed automatically on your shift  note.  Outcome: Progressing  Flowsheets (Taken 4/28/2024 0924)  Outcome Evaluation: Frequent coughing, low BP.  Pain from coughing. 94% RA  Plan of Care Reviewed With: patient  Overall Patient Progress: no change  Goal: Patient-Specific Goal (Individualized)  Description: You can add care plan individualizations to a care plan. Examples of Individualization might be:  \"Parent requests to be called daily at 9am for " "status\", \"I have a hard time hearing out of my right ear\", or \"Do not touch me to wake me up as it startles  me\".  Outcome: Progressing  Goal: Absence of Hospital-Acquired Illness or Injury  Outcome: Progressing  Goal: Optimal Comfort and Wellbeing  Outcome: Progressing  Goal: Readiness for Transition of Care  Outcome: Progressing     "

## 2024-04-28 NOTE — PROVIDER NOTIFICATION
[facility] DR. Fabrizio Samano notified:    FYI patient's BP remained in the low 80's latest 84/46 DC 95 RR 20 O2 Sats 92% on RA, had a couple IV buloses BP remained the same, complained feeling dizzy when moving. BP check on the Rt leg which is around 90's SBP. Can you kindly please review patient as what will be the plan for the continuous hypotension. Thank you.     Brisa Greenberg RN

## 2024-04-28 NOTE — PLAN OF CARE
PRIMARY DIAGNOSIS: RSV/Wkns/Fall   OUTPATIENT/OBSERVATION GOALS TO BE MET BEFORE DISCHARGE:  ADLs back to baseline: No    Activity and level of assistance: Ax2 with cares in bed, pt too weak to get oob, informed by day shift nurse knees buckle when up    Pain status: Improved-controlled with oral pain medications.    Return to near baseline physical activity: No     Discharge Planner Nurse   Safe discharge environment identified:  PT consult  Barriers to discharge: Yes       Entered by: Tiffanie Smith RN 04/28/2024 12:18 AM     Please review provider order for any additional goals.   Nurse to notify provider when observation goals have been met and patient is ready for discharge.    Temp: 98.9  F (37.2  C) Temp src: Oral BP: 98/54 Pulse: 95   Resp: 20 SpO2: 92 % O2 Device: None (Room air)       A&O, forgetful at times. Ax2 with cares in bed/and if oob. When pt coughs, pt urinates, frequent productive cough. Purewick placed as pt is not getting oob, still incontinent at times. Complained of chest discomfort due to coughing so much/generalized body aches, prn tylenol given x2. Pts main complaint is her cough, provider ordered robitussin, given x2 with little relief. Provider notified asking for another medication for cough, tessalon ordered and given with little relief. Paged crosscover as pt later was vomiting due to coughing, provider said to give neb, neb given and pt unable to tolerate neb as pt was coughing, provider paged again, robitussin with dextromethorphan ordered- [facility] shift nurse administered. BP still running soft after x3 500mL boluses/IVF running at 100mLs- provider aware. Lactic normalized 2.7-- 2.3-- 2. Plan- blood cultures pending, IVF, monitor bp, I&Os, cipro added every 12 hours, PT consult.       Problem: Adult Inpatient Plan of Care  Goal: Plan of Care Review  Description: The Plan of Care Review/Shift note should be completed every shift.  The Outcome Evaluation is a brief statement about your  "assessment that the patient is improving, declining, or no change.  This information will be displayed automatically on your shift  note.  Outcome: Not Progressing  Goal: Patient-Specific Goal (Individualized)  Description: You can add care plan individualizations to a care plan. Examples of Individualization might be:  \"Parent requests to be called daily at 9am for status\", \"I have a hard time hearing out of my right ear\", or \"Do not touch me to wake me up as it startles  me\".  Outcome: Not Progressing  Goal: Absence of Hospital-Acquired Illness or Injury  Outcome: Not Progressing  Goal: Optimal Comfort and Wellbeing  Outcome: Not Progressing  Intervention: Monitor Pain and Promote Comfort  Recent Flowsheet Documentation  Taken 4/27/2024 2140 by Tiffanie Smith, RN  Pain Management Interventions: medication (see MAR)  Taken 4/27/2024 1625 by Tiffanie Smith, RN  Pain Management Interventions: medication (see MAR)  Goal: Readiness for Transition of Care  Outcome: Not Progressing     Problem: Fall Injury Risk  Goal: Absence of Fall and Fall-Related Injury  Outcome: Not Progressing     Problem: Gas Exchange Impaired  Goal: Optimal Gas Exchange  Outcome: Not Progressing     Problem: Fatigue  Goal: Improved Activity Tolerance  Outcome: Not Progressing     Problem: Comorbidity Management  Goal: Maintenance of Heart Failure Symptom Control  Outcome: Not Progressing                           "

## 2024-04-28 NOTE — PROGRESS NOTES
[facility]    Hospitalist Progress Note  Name: Cathleen Ty    MRN: [id number]  Provider:  Coleman Samano DO  Date of Service: 04/28/2024    Summary of Stay: Cathleen Ty is a 73 year old female with a history of HFrEF with most recent EF 33%, breast cancer with metastasis to bone and follows with Dr. Stiles of oncology through [facility] currently on Ibrance, type 2 diabetes mellitus with insulin dependence, BEN, fibromyalgia, hyperlipidemia, chronic kidney disease, GERD, SSS status post biventricular pacemaker admitted on 4/27/2024 with generalized weakness, upper respiratory symptoms.  In the emergency department, the patient is found to have a blood pressure of 102/56, heart rate 68, temperature 98.2  F, respiratory rate 18, SpO2 98% on room air.  Initial lab work showed chloride 96, BUN/creatinine 21.1/1.16, proBNP 1944, glucose 133, leukopenia of 3.5, thrombocytopenia 84, procalcitonin 0.15.  Urinalysis was negative for signs of infection.  The patient tested positive for RSV.  Chest x-ray showed cardiomegaly with apparent right lung haziness that is favored to be artifactual/overlapping structures.  Physical therapy was consulted to see the patient    TODAY'S PLAN:  Appreciate ID recommendations.  Discontinue IV ciprofloxacin.  No concern for UTI at this point.  Will follow urine culture, if sent.  No symptoms of UTI presently and pt incontinent of urine at baseline.  Plan for symptom management.  Biggest complaint is her cough.  On tessalon, robitussin, and throat lozenges.  No wheezing on exam so no role for steroids at this point.  Pt requesting PTA zyrtec, which we will resume.  Appreciate PT recommendations and anticipate discharge home tomorrow pending PT recommendations.  [place] [facility] senior living.  Pt reports she does not have any services at baseline.    Problem List:   Generalized Weakness  RSV Infection  - Appreciate ID  recommendations  - Symptom management  - Antitussives  - Appreciate PT recommendations    Pancytopenia  Breast CA with Mets to Bone  - Follows with Dr. Stiles of [facility].  Currently on Ibrance  - Hold ASA 81 mg daily and Ibrance for now  - Likely due to acute viral infection  - Daily CBC    Chronic Medical Problems:  HFrEF  Type 2 Diabetes Mellitus  BEN without CPAP  Fibromyalgia  Hyperlipidemia  Chronic Kidney Disease  SSS s/p PPM  GERD    Clinically Significant Risk Factors Present on Admission                # Drug Induced Platelet Defect: home medication list includes an antiplatelet medication        # Obesity: Estimated body mass index is 34.39 kg/m  as calculated from the following:    Height as of this encounter: 1.524 m (5').    Weight as of this encounter: 79.9 kg (176 lb 1.6 oz).               I spent 48 minutes in reviewing this patient's labs, imaging, medications, medical history.  In addition time was spent interviewing the patient, communicating with family, and medical decision making.      DVT Prophylaxis: Pneumatic Compression Devices  Code Status: Special Code  Diet: Combination Diet Moderate Consistent Carb (60 g CHO per Meal) Diet; 3 gm NA Diet    Pelaez Catheter: Not present    Disposition: Medically Ready for Discharge: Anticipated Tomorrow    Goals to discharge include: PT evaluation complete, symptoms stable, cell counts stable  Family updated today: No     Interval History   Pt seen and examined.      -Data reviewed today: I personally reviewed all new labs and imaging results over the last 24 hours.     Physical Exam   Temp: 97.3  F (36.3  C) Temp src: Oral BP: (!) 87/59 Pulse: 89   Resp: 20 SpO2: 96 % O2 Device: None (Room air)    Vitals:    04/27/24 1201 04/28/24 0831   Weight: 79.3 kg (174 lb 13.2 oz) 79.9 kg (176 lb 1.6 oz)     Vital Signs with Ranges  Temp:  [97.3  F (36.3  C)-99.3  F (37.4  C)] 97.3  F (36.3  C)  Pulse:  [] 89  Resp:  [18-20] 20  BP: ()/(46-65)  87/59  SpO2:  [91 %-96 %] 96 %  I/O last 3 completed shifts:  In: 480 [P.O.:480]  Out: 750 [Urine:750]    GENERAL: No apparent distress. Awake, alert, and fully oriented.  HEENT: Normocephalic, atraumatic. Extraocular movements intact.  CARDIOVASCULAR: Regular rate and rhythm without murmurs or rubs. No S3.  PULMONARY: Clear bilaterally.  GASTROINTESTINAL: Soft, non-tender, non-distended. Bowel sounds normoactive.   EXTREMITIES: No cyanosis or clubbing. No edema.  NEUROLOGICAL: CN 2-12 grossly intact, no focal neurological deficits.  DERMATOLOGICAL: No rash, ulcer, bruising, nor jaundice.    Medications   Current Facility-Administered Medications   Medication Dose Route Frequency Provider Last Rate Last Admin     Current Facility-Administered Medications   Medication Dose Route Frequency Provider Last Rate Last Admin    acyclovir (ZOVIRAX) tablet 400 mg  400 mg Oral Daily Coleman Samano DO   400 mg at 04/28/24 1031    cetirizine (zyrTEC) tablet 10 mg  10 mg Oral Daily Coleman Samano DO        DULoxetine (CYMBALTA) DR capsule 30 mg  30 mg Oral Daily Coleman Samano DO   30 mg at 04/28/24 1030    finasteride (PROSCAR) half-tab 2.5 mg  2.5 mg Oral Daily Coleman Samano DO   2.5 mg at 04/28/24 1030    lactated ringers BOLUS 500 mL  500 mL Intravenous Once [facility] Bliss, APRN CNP 1,000 mL/hr at 04/27/24 2201 Rate Change at 04/27/24 2201    letrozole (FEMARA) tablet 2.5 mg  2.5 mg Oral Daily Coleman Samano DO   2.5 mg at 04/28/24 1030    montelukast (SINGULAIR) tablet 10 mg  10 mg Oral At Bedtime Coleman Samano DO        OLANZapine (zyPREXA) tablet 5-10 mg  5-10 mg Oral At Bedtime Coleman Samano DO        pantoprazole (PROTONIX) EC tablet 40 mg  40 mg Oral Daily Coleman Samano DO   40 mg at 04/28/24 0821    pregabalin (LYRICA) capsule 200 mg  200 mg Oral BID Coleman Samano DO   200 mg at 04/28/24 1030    rosuvastatin (CRESTOR) tablet 20 mg  20 mg  "Oral At Bedtime Coleman Samano DO        sodium chloride (PF) 0.9% PF flush 3 mL  3 mL Intracatheter Q8H Kim Mansfield PA-C   3 mL at 04/28/24 0442     Data     Laboratory:  Recent Labs   Lab 04/28/24  0708 04/27/24  0732   WBC 3.6* 3.5*   HGB 9.8* 12.2   HCT 28.8* 35.4   * 100   PLT 60* 84*     Recent Labs   Lab 04/28/24  0747 04/28/24  0708 04/28/24  0213 04/27/24  1515 04/27/24  0732   NA  --  142  --   --  135   POTASSIUM  --  3.7  --   --  3.5   CHLORIDE  --  105  --   --  96*   CO2  --  23  --   --  25   ANIONGAP  --  14  --   --  14   * 122* 121*   < > 133*   BUN  --  17.7  --   --  21.1   CR  --  0.92  --   --  1.16*   GFRESTIMATED  --  65  --   --  49*   BUD  --  9.0  --   --  8.9    < > = values in this interval not displayed.     No results for input(s): \"CULT\" in the last 168 hours.  No results found for: \"TROPI\"    Imaging:  No results found for this or any previous visit (from the past 24 hour(s)).      Coleman Samano DO  [facility] Hospitalist  [address] [place] [address]  Securely message with [employer] (more info)  Text page via [employer] Paging/Directory   04/28/2024   "

## 2024-04-28 NOTE — PLAN OF CARE
"PRIMARY DIAGNOSIS: RSV  OUTPATIENT/OBSERVATION GOALS TO BE MET BEFORE DISCHARGE:  Dyspnea improved and O2 sats >88% on RA or back to baseline O2 levels: Yes   SpO2: 94 %, O2 Device: None (Room air)    Tolerating oral abx or appropriate plans made outpatient infusion:  NA    Vitals signs normal or return to baseline: /65 (BP Location: Right arm)   Pulse 99   Temp 97.6  F (36.4  C) (Oral)   Resp 18   Ht 1.524 m (5')   Wt 79.9 kg (176 lb 1.6 oz)   SpO2 94%   BMI 34.39 kg/m      Short term supplemental O2 needed with activity at home: No    Tolerate oral intake to maintain hydration: Yes    Return to near baseline physical activity: Yes    Discharge Planner Nurse   Safe discharge environment identified: Yes  Barriers to discharge: No       Entered by: Rehana Davidson RN 04/28/2024 8:00AM     Please review provider order for any additional goals.   Nurse to notify provider when observation goals have been met and patient is ready for discharge.    Goal Outcome Evaluation:      Plan of Care Reviewed With: patient    Overall Patient Progress: no changeOverall Patient Progress: no change    Outcome Evaluation: Frequent coughing, low BP.  Pain from coughing. 94% RA      Problem: Adult Inpatient Plan of Care  Goal: Plan of Care Review  Description: The Plan of Care Review/Shift note should be completed every shift.  The Outcome Evaluation is a brief statement about your assessment that the patient is improving, declining, or no change.  This information will be displayed automatically on your shift  note.  Outcome: Progressing  Flowsheets (Taken 4/28/2024 0924)  Outcome Evaluation: Frequent coughing, low BP.  Pain from coughing. 94% RA  Plan of Care Reviewed With: patient  Overall Patient Progress: no change  Goal: Patient-Specific Goal (Individualized)  Description: You can add care plan individualizations to a care plan. Examples of Individualization might be:  \"Parent requests to be called daily at 9am for " "status\", \"I have a hard time hearing out of my right ear\", or \"Do not touch me to wake me up as it startles  me\".  Outcome: Progressing  Goal: Absence of Hospital-Acquired Illness or Injury  Outcome: Progressing  Goal: Optimal Comfort and Wellbeing  Outcome: Progressing  Goal: Readiness for Transition of Care  Outcome: Progressing     "

## 2024-04-28 NOTE — PLAN OF CARE
"PRIMARY DIAGNOSIS: RSV  OUTPATIENT/OBSERVATION GOALS TO BE MET BEFORE DISCHARGE:  Dyspnea improved and O2 sats >88% on RA or back to baseline O2 levels: Yes   SpO2: 92 %, O2 Device: None (Room air)    Tolerating oral abx or appropriate plans made outpatient infusion:  IV Ciprofloxacin    Vitals signs normal or return to baseline: No    Short term supplemental O2 needed with activity at home:  NA    Tolerate oral intake to maintain hydration: Yes    Return to near baseline physical activity: No    Discharge Planner Nurse   Safe discharge environment identified: No  Barriers to discharge: Yes       Entered by: Brisa Greenberg RN 04/28/2024 1:00 AM     Alert and oriented. Frequent coughing noted. BP remained on low 80's, with dizziness when moving .[employer] notified via [employer]. On continuous IV fluids  ml/hr.    Problem: Adult Inpatient Plan of Care  Goal: Plan of Care Review  Description: The Plan of Care Review/Shift note should be completed every shift.  The Outcome Evaluation is a brief statement about your assessment that the patient is improving, declining, or no change.  This information will be displayed automatically on your shift  note.  Outcome: Not Progressing  Flowsheets (Taken 4/28/2024 0059)  Outcome Evaluation: coughing worst/ BP low  Plan of Care Reviewed With: patient  Overall Patient Progress: no change  Goal: Patient-Specific Goal (Individualized)  Description: You can add care plan individualizations to a care plan. Examples of Individualization might be:  \"Parent requests to be called daily at 9am for status\", \"I have a hard time hearing out of my right ear\", or \"Do not touch me to wake me up as it startles  me\".  Outcome: Not Progressing  Goal: Absence of Hospital-Acquired Illness or Injury  Outcome: Not Progressing  Goal: Optimal Comfort and Wellbeing  Outcome: Not Progressing  Goal: Readiness for Transition of Care  Outcome: Not Progressing     Problem: Fall Injury Risk  Goal: " Absence of Fall and Fall-Related Injury  Outcome: Not Progressing     Problem: Gas Exchange Impaired  Goal: Optimal Gas Exchange  Outcome: Not Progressing     Problem: Fatigue  Goal: Improved Activity Tolerance  Outcome: Not Progressing     Problem: Comorbidity Management  Goal: Maintenance of Heart Failure Symptom Control  Outcome: Not Progressing     Goal Outcome Evaluation:      Plan of Care Reviewed With: patient    Overall Patient Progress: no changeOverall Patient Progress: no change    Outcome Evaluation: coughing worst/ BP low

## 2024-04-28 NOTE — PLAN OF CARE
PRIMARY DIAGNOSIS: RSV  OUTPATIENT/OBSERVATION GOALS TO BE MET BEFORE DISCHARGE:  Dyspnea improved and O2 sats >88% on RA or back to baseline O2 levels: Yes   SpO2: 93 %, O2 Device: None (Room air)    Tolerating oral abx or appropriate plans made outpatient infusion: Yes IV Ciprofloxacin    Vitals signs normal or return to baseline: No hypotension    Short term supplemental O2 needed with activity at home: No    Tolerate oral intake to maintain hydration: Yes    Return to near baseline physical activity: No    Discharge Planner Nurse   Safe discharge environment identified: No  Barriers to discharge: Yes       Entered by: Brisa Greenberg RN 04/28/2024 4:23 AM     Frequent coughing still evident, PRN cough medication given with minimal effect per patient.Denies any shortness of breath but dyspnea on exertion.  Unable to sleep. BP slightly better SBP in high 90's. Incontinent of urine, Purewick in place.     Please review provider order for any additional goals.   Nurse to notify provider when observation goals have been met and patient is ready for discharge.  Problem: Adult Inpatient Plan of Care  Goal: Plan of Care Review  Description: The Plan of Care Review/Shift note should be completed every shift.  The Outcome Evaluation is a brief statement about your assessment that the patient is improving, declining, or no change.  This information will be displayed automatically on your shift  note.  4/28/2024 0422 by Brisa Greenberg RN  Outcome: Not Progressing  Flowsheets (Taken 4/28/2024 0422)  Outcome Evaluation: frequent coughing / low BP  Plan of Care Reviewed With: patient  Overall Patient Progress: no change  4/28/2024 0059 by Brisa Greenberg RN  Outcome: Not Progressing  Flowsheets (Taken 4/28/2024 0059)  Outcome Evaluation: coughing worst/ BP low  Plan of Care Reviewed With: patient  Overall Patient Progress: no change  Goal: Patient-Specific Goal (Individualized)  Description: You can add care plan  "individualizations to a care plan. Examples of Individualization might be:  \"Parent requests to be called daily at 9am for status\", \"I have a hard time hearing out of my right ear\", or \"Do not touch me to wake me up as it startles  me\".  4/28/2024 0422 by Birsa Greenberg RN  Outcome: Not Progressing  4/28/2024 0059 by Brisa Greenberg RN  Outcome: Not Progressing  Goal: Absence of Hospital-Acquired Illness or Injury  4/28/2024 0422 by Brisa Greenberg RN  Outcome: Not Progressing  4/28/2024 0059 by Brisa Greenberg RN  Outcome: Not Progressing  Intervention: Identify and Manage Fall Risk  Recent Flowsheet Documentation  Taken 4/28/2024 0108 by Brisa Greenberg RN  Safety Promotion/Fall Prevention: activity supervised  Intervention: Prevent Infection  Recent Flowsheet Documentation  Taken 4/28/2024 0108 by Brisa Greenberg RN  Infection Prevention:   rest/sleep promoted   hand hygiene promoted   single patient room provided  Goal: Optimal Comfort and Wellbeing  4/28/2024 0422 by Brisa Greenberg RN  Outcome: Not Progressing  4/28/2024 0059 by Brisa Greenberg RN  Outcome: Not Progressing  Intervention: Monitor Pain and Promote Comfort  Recent Flowsheet Documentation  Taken 4/28/2024 0138 by Brisa Greenberg RN  Pain Management Interventions: medication (see MAR)  Goal: Readiness for Transition of Care  4/28/2024 0422 by Brisa Greenberg RN  Outcome: Not Progressing  4/28/2024 0059 by Brisa Greenberg RN  Outcome: Not Progressing     Problem: Fall Injury Risk  Goal: Absence of Fall and Fall-Related Injury  4/28/2024 0422 by Brisa Greenberg RN  Outcome: Not Progressing  4/28/2024 0059 by Brisa Greenberg RN  Outcome: Not Progressing  Intervention: Identify and Manage Contributors  Recent Flowsheet Documentation  Taken 4/28/2024 0108 by Brisa Greenberg RN  Medication Review/Management: medications reviewed  Intervention: Promote Injury-Free Environment  Recent Flowsheet " Documentation  Taken 4/28/2024 0108 by Brisa Greenberg RN  Safety Promotion/Fall Prevention: activity supervised     Problem: Gas Exchange Impaired  Goal: Optimal Gas Exchange  4/28/2024 0422 by Brisa Greenberg RN  Outcome: Not Progressing  4/28/2024 0059 by Brisa Greenberg RN  Outcome: Not Progressing     Problem: Fatigue  Goal: Improved Activity Tolerance  4/28/2024 0422 by Brisa Greenberg RN  Outcome: Not Progressing  4/28/2024 0059 by Brisa Greenberg RN  Outcome: Not Progressing  Intervention: Promote Improved Energy  Recent Flowsheet Documentation  Taken 4/28/2024 0108 by Brisa Greenberg RN  Fatigue Management: activity assistance provided     Problem: Comorbidity Management  Goal: Maintenance of Heart Failure Symptom Control  4/28/2024 0422 by Brisa Greenberg RN  Outcome: Not Progressing  4/28/2024 0059 by Brisa Greenberg RN  Outcome: Not Progressing  Intervention: Maintain Heart Failure Management  Recent Flowsheet Documentation  Taken 4/28/2024 0108 by Brisa Greenberg RN  Medication Review/Management: medications reviewed     Goal Outcome Evaluation:      Plan of Care Reviewed With: patient    Overall Patient Progress: no changeOverall Patient Progress: no change    Outcome Evaluation: frequent coughing / low BP

## 2024-04-29 ENCOUNTER — APPOINTMENT (OUTPATIENT)
Dept: PHYSICAL THERAPY | Facility: CLINIC | Age: 74
End: 2024-04-29
Attending: INTERNAL MEDICINE
Payer: COMMERCIAL

## 2024-04-29 LAB
ANION GAP SERPL CALCULATED.3IONS-SCNC: 12 MMOL/L (ref 7–15)
BASOPHILS # BLD AUTO: ABNORMAL 10*3/UL
BASOPHILS # BLD MANUAL: 0.1 10E3/UL (ref 0–0.2)
BASOPHILS NFR BLD AUTO: ABNORMAL %
BASOPHILS NFR BLD MANUAL: 2 %
BUN SERPL-MCNC: 15.7 MG/DL (ref 8–23)
CALCIUM SERPL-MCNC: 9.6 MG/DL (ref 8.8–10.2)
CHLORIDE SERPL-SCNC: 110 MMOL/L (ref 98–107)
CREAT SERPL-MCNC: 0.8 MG/DL (ref 0.51–0.95)
DEPRECATED HCO3 PLAS-SCNC: 22 MMOL/L (ref 22–29)
EGFRCR SERPLBLD CKD-EPI 2021: 77 ML/MIN/1.73M2
ELLIPTOCYTES BLD QL SMEAR: SLIGHT
EOSINOPHIL # BLD AUTO: ABNORMAL 10*3/UL
EOSINOPHIL # BLD MANUAL: 0 10E3/UL (ref 0–0.7)
EOSINOPHIL NFR BLD AUTO: ABNORMAL %
EOSINOPHIL NFR BLD MANUAL: 1 %
ERYTHROCYTE [DISTWIDTH] IN BLOOD BY AUTOMATED COUNT: 16.8 % (ref 10–15)
GLUCOSE BLDC GLUCOMTR-MCNC: 103 MG/DL (ref 70–99)
GLUCOSE BLDC GLUCOMTR-MCNC: 138 MG/DL (ref 70–99)
GLUCOSE BLDC GLUCOMTR-MCNC: 197 MG/DL (ref 70–99)
GLUCOSE BLDC GLUCOMTR-MCNC: 197 MG/DL (ref 70–99)
GLUCOSE SERPL-MCNC: 119 MG/DL (ref 70–99)
HCT VFR BLD AUTO: 34 % (ref 35–47)
HGB BLD-MCNC: 11.6 G/DL (ref 11.7–15.7)
IMM GRANULOCYTES # BLD: ABNORMAL 10*3/UL
IMM GRANULOCYTES NFR BLD: ABNORMAL %
LYMPHOCYTES # BLD AUTO: ABNORMAL 10*3/UL
LYMPHOCYTES # BLD MANUAL: 1.6 10E3/UL (ref 0.8–5.3)
LYMPHOCYTES NFR BLD AUTO: ABNORMAL %
LYMPHOCYTES NFR BLD MANUAL: 39 %
MCH RBC QN AUTO: 34.8 PG (ref 26.5–33)
MCHC RBC AUTO-ENTMCNC: 34.1 G/DL (ref 31.5–36.5)
MCV RBC AUTO: 102 FL (ref 78–100)
MONOCYTES # BLD AUTO: ABNORMAL 10*3/UL
MONOCYTES # BLD MANUAL: 0.1 10E3/UL (ref 0–1.3)
MONOCYTES NFR BLD AUTO: ABNORMAL %
MONOCYTES NFR BLD MANUAL: 3 %
NEUTROPHILS # BLD AUTO: ABNORMAL 10*3/UL
NEUTROPHILS # BLD MANUAL: 2.2 10E3/UL (ref 1.6–8.3)
NEUTROPHILS NFR BLD AUTO: ABNORMAL %
NEUTROPHILS NFR BLD MANUAL: 55 %
NRBC # BLD AUTO: 0 10E3/UL
NRBC BLD AUTO-RTO: 0 /100
PLAT MORPH BLD: ABNORMAL
PLATELET # BLD AUTO: 61 10E3/UL (ref 150–450)
POTASSIUM SERPL-SCNC: 4.1 MMOL/L (ref 3.4–5.3)
RBC # BLD AUTO: 3.33 10E6/UL (ref 3.8–5.2)
RBC MORPH BLD: ABNORMAL
SODIUM SERPL-SCNC: 144 MMOL/L (ref 135–145)
WBC # BLD AUTO: 4 10E3/UL (ref 4–11)

## 2024-04-29 PROCEDURE — 82962 GLUCOSE BLOOD TEST: CPT

## 2024-04-29 PROCEDURE — 97161 PT EVAL LOW COMPLEX 20 MIN: CPT | Mod: GP

## 2024-04-29 PROCEDURE — G0378 HOSPITAL OBSERVATION PER HR: HCPCS

## 2024-04-29 PROCEDURE — 250N000013 HC RX MED GY IP 250 OP 250 PS 637: Performed by: INTERNAL MEDICINE

## 2024-04-29 PROCEDURE — 250N000013 HC RX MED GY IP 250 OP 250 PS 637: Performed by: PHYSICIAN ASSISTANT

## 2024-04-29 PROCEDURE — 80048 BASIC METABOLIC PNL TOTAL CA: CPT | Performed by: INTERNAL MEDICINE

## 2024-04-29 PROCEDURE — 99213 OFFICE O/P EST LOW 20 MIN: CPT | Performed by: INTERNAL MEDICINE

## 2024-04-29 PROCEDURE — 97530 THERAPEUTIC ACTIVITIES: CPT | Mod: GP

## 2024-04-29 PROCEDURE — 99232 SBSQ HOSP IP/OBS MODERATE 35: CPT | Performed by: INTERNAL MEDICINE

## 2024-04-29 PROCEDURE — 36415 COLL VENOUS BLD VENIPUNCTURE: CPT | Performed by: INTERNAL MEDICINE

## 2024-04-29 PROCEDURE — 999N000111 HC STATISTIC OT IP EVAL DEFER

## 2024-04-29 PROCEDURE — 97116 GAIT TRAINING THERAPY: CPT | Mod: GP

## 2024-04-29 PROCEDURE — 85041 AUTOMATED RBC COUNT: CPT | Performed by: INTERNAL MEDICINE

## 2024-04-29 PROCEDURE — 85007 BL SMEAR W/DIFF WBC COUNT: CPT | Performed by: INTERNAL MEDICINE

## 2024-04-29 RX ORDER — LORAZEPAM 0.5 MG/1
0.5 TABLET ORAL 2 TIMES DAILY PRN
Status: DISCONTINUED | OUTPATIENT
Start: 2024-04-29 | End: 2024-05-01 | Stop reason: HOSPADM

## 2024-04-29 RX ORDER — TORSEMIDE 20 MG/1
60 TABLET ORAL DAILY
Status: DISCONTINUED | OUTPATIENT
Start: 2024-04-29 | End: 2024-05-01 | Stop reason: HOSPADM

## 2024-04-29 RX ADMIN — FINASTERIDE 2.5 MG: 5 TABLET, FILM COATED ORAL at 08:19

## 2024-04-29 RX ADMIN — ACETAMINOPHEN 650 MG: 325 TABLET, FILM COATED ORAL at 15:56

## 2024-04-29 RX ADMIN — CETIRIZINE HYDROCHLORIDE 10 MG: 10 TABLET, FILM COATED ORAL at 08:17

## 2024-04-29 RX ADMIN — Medication 1 LOZENGE: at 10:52

## 2024-04-29 RX ADMIN — PANTOPRAZOLE SODIUM 40 MG: 40 TABLET, DELAYED RELEASE ORAL at 08:17

## 2024-04-29 RX ADMIN — OLANZAPINE 10 MG: 5 TABLET, FILM COATED ORAL at 20:13

## 2024-04-29 RX ADMIN — PREGABALIN 150 MG: 75 CAPSULE ORAL at 20:12

## 2024-04-29 RX ADMIN — MONTELUKAST 10 MG: 10 TABLET, FILM COATED ORAL at 20:13

## 2024-04-29 RX ADMIN — ROSUVASTATIN 20 MG: 10 TABLET, FILM COATED ORAL at 20:12

## 2024-04-29 RX ADMIN — ACETAMINOPHEN 650 MG: 325 TABLET, FILM COATED ORAL at 08:17

## 2024-04-29 RX ADMIN — PREGABALIN 150 MG: 75 CAPSULE ORAL at 08:17

## 2024-04-29 RX ADMIN — DULOXETINE HYDROCHLORIDE 30 MG: 30 CAPSULE, DELAYED RELEASE ORAL at 08:17

## 2024-04-29 RX ADMIN — ACYCLOVIR 400 MG: 400 TABLET ORAL at 08:17

## 2024-04-29 RX ADMIN — TORSEMIDE 60 MG: 20 TABLET ORAL at 10:52

## 2024-04-29 RX ADMIN — LETROZOLE 2.5 MG: 2.5 TABLET ORAL at 08:17

## 2024-04-29 ASSESSMENT — ACTIVITIES OF DAILY LIVING (ADL)
ADLS_ACUITY_SCORE: 46
DEPENDENT_IADLS:: CLEANING;TRANSPORTATION
ADLS_ACUITY_SCORE: 46

## 2024-04-29 NOTE — PROGRESS NOTES
[facility]  Infectious Disease Progress Note          Assessment and Plan:   Date of Admission:  4/27/2024  Date of Consult (When I saw the patient): 04/28/24        Assessment & Plan  Cathleen Ty is a 73 year old who was admitted on 4/27/2024.      Impression:  73-year-old with breast cancer with bone mets on Ibrance   Also history of heart failure  Diabetes  Pacemaker in place  Hypertension, CKD  Admitted with cough congestion and generalized weakness  ongoing for about a week  Tested positive for RSV  No fevers, no hypoxia says she feels slightly better than at admission         Recommendations   Supportive treatment , RSV treatment is not indicated in this situation has a number of symptoms likely RSV related including the laryngitis           Interval History:     no new complaints but does not feel well, temp is generally down, no signs of any secondary bacterial infection, some laryngitis and throat symptoms likely RSV also.  n.              Medications:     Current Facility-Administered Medications   Medication Dose Route Frequency Provider Last Rate Last Admin    acyclovir (ZOVIRAX) tablet 400 mg  400 mg Oral Daily Coleman Samano DO   400 mg at 04/29/24 0817    cetirizine (zyrTEC) tablet 10 mg  10 mg Oral Daily Coleman Samano DO   10 mg at 04/29/24 0817    DULoxetine (CYMBALTA) DR capsule 30 mg  30 mg Oral Daily Coleman Samano DO   30 mg at 04/29/24 0817    finasteride (PROSCAR) half-tab 2.5 mg  2.5 mg Oral Daily Coleman Samano DO   2.5 mg at 04/29/24 0819    lactated ringers BOLUS 500 mL  500 mL Intravenous Once Jaycob Antunez, APRN CNP 1,000 mL/hr at 04/27/24 2201 Rate Change at 04/27/24 2201    letrozole (FEMARA) tablet 2.5 mg  2.5 mg Oral Daily Coleman Samano DO   2.5 mg at 04/29/24 0817    montelukast (SINGULAIR) tablet 10 mg  10 mg Oral At Bedtime Coleman Samano DO   10 mg at 04/28/24 2104    OLANZapine (zyPREXA) tablet  "5-10 mg  5-10 mg Oral At Bedtime Coleman Samano Joselito, DO   10 mg at 04/28/24 2104    pantoprazole (PROTONIX) EC tablet 40 mg  40 mg Oral Daily Coleman Samano Joselito, DO   40 mg at 04/29/24 0817    pregabalin (LYRICA) capsule 150 mg  150 mg Oral BID Selwyn, Coleman Joselito, DO   150 mg at 04/29/24 0817    rosuvastatin (CRESTOR) tablet 20 mg  20 mg Oral At Bedtime Selwyn Coleman Joselito, DO   20 mg at 04/28/24 2103    sodium chloride (PF) 0.9% PF flush 3 mL  3 mL Intracatheter Q8H Kim Mansfield PA-C   3 mL at 04/29/24 0557    torsemide (DEMADEX) tablet 60 mg  60 mg Oral Daily Coleman Samano Joselito, DO   60 mg at 04/29/24 1052                  Physical Exam:   Blood pressure 109/68, pulse 101, temperature 97.5  F (36.4  C), temperature source Oral, resp. rate 16, height 1.524 m (5'), weight 79.9 kg (176 lb 1.6 oz), SpO2 97%.  Wt Readings from Last 2 Encounters:   04/28/24 79.9 kg (176 lb 1.6 oz)   11/13/21 72.6 kg (160 lb)     Vital Signs with Ranges  Temp:  [97.5  F (36.4  C)-98.8  F (37.1  C)] 97.5  F (36.4  C)  Pulse:  [] 101  Resp:  [16-20] 16  BP: (102-122)/(51-78) 109/68  SpO2:  [88 %-97 %] 97 %    Constitutional: Awake, alert, cooperative, no apparent distress looks mildly ill     Lungs: Clear to auscultation bilaterally, no crackles or wheezing   Cardiovascular: Regular rate and rhythm, normal S1 and S2, and no murmur noted   Abdomen: Normal bowel sounds, soft, non-distended, non-tender   Skin: No rashes, no cyanosis, no edema   Other:           Data:   All microbiology laboratory data reviewed.  Recent Labs   Lab Test 04/29/24  0833 04/28/24  0708 04/27/24  0732   WBC 4.0 3.6* 3.5*   HGB 11.6* 9.8* 12.2   HCT 34.0* 28.8* 35.4   * 101* 100   PLT 61* 60* 84*     Recent Labs   Lab Test 04/29/24  0833 04/28/24  0708 04/27/24  0732   CR 0.80 0.92 1.16*     No lab results found.  No lab results found.    Invalid input(s): \"UC\"     "

## 2024-04-29 NOTE — PROVIDER NOTIFICATION
[facility] Notified:    Patient is RSV positive current Oxygen Sats is 85-88% on RA complained of SOB with continuous coughing, lung sounds diminished started on oxygen at 2LPM/NC. Please advise thanks. O2 Sats now 92-93 on oxygen.     Brisa Greenberg RN

## 2024-04-29 NOTE — PLAN OF CARE
"PRIMARY DIAGNOSIS: RSV/Generalized weakness  OUTPATIENT/OBSERVATION GOALS TO BE MET BEFORE DISCHARGE:  Dyspnea improved and O2 sats >88% on RA or back to baseline O2 levels: No   SpO2: 95 %, O2 Device: Nasal cannula    Tolerating oral abx or appropriate plans made outpatient infusion:  NA    Vitals signs normal or return to baseline: No    Short term supplemental O2 needed with activity at home: No    Tolerate oral intake to maintain hydration: Yes    Return to near baseline physical activity: Yes    Discharge Planner Nurse   Safe discharge environment identified: Yes  Barriers to discharge: Yes       Entered by: Brisa Greenberg RN 04/29/2024 2:32 AM   Alert and oriented. Now on oxygen at 2LPM/NC, maintaining Sats of 92-94 %. Cough medications given and tried nebulizer but patient unable to tolerate nebs.  Please review provider order for any additional goals.   Nurse to notify provider when observation goals have been met and patient is ready for discharge.  Problem: Adult Inpatient Plan of Care  Goal: Plan of Care Review  Description: The Plan of Care Review/Shift note should be completed every shift.  The Outcome Evaluation is a brief statement about your assessment that the patient is improving, declining, or no change.  This information will be displayed automatically on your shift  note.  Outcome: Progressing  Flowsheets (Taken 4/29/2024 0231)  Plan of Care Reviewed With: patient  Overall Patient Progress: no change  Goal: Patient-Specific Goal (Individualized)  Description: You can add care plan individualizations to a care plan. Examples of Individualization might be:  \"Parent requests to be called daily at 9am for status\", \"I have a hard time hearing out of my right ear\", or \"Do not touch me to wake me up as it startles  me\".  Outcome: Progressing  Goal: Absence of Hospital-Acquired Illness or Injury  Outcome: Progressing  Intervention: Identify and Manage Fall Risk  Recent Flowsheet Documentation  Taken " 4/28/2024 2217 by Brisa Greenberg RN  Safety Promotion/Fall Prevention: activity supervised  Intervention: Prevent and Manage VTE (Venous Thromboembolism) Risk  Recent Flowsheet Documentation  Taken 4/28/2024 2217 by Brisa Greenberg RN  VTE Prevention/Management: SCDs (sequential compression devices) off  Intervention: Prevent Infection  Recent Flowsheet Documentation  Taken 4/28/2024 2217 by Brisa Greenberg RN  Infection Prevention:   rest/sleep promoted   single patient room provided   hand hygiene promoted  Goal: Optimal Comfort and Wellbeing  Outcome: Progressing  Intervention: Monitor Pain and Promote Comfort  Recent Flowsheet Documentation  Taken 4/28/2024 2157 by Brisa Greenberg RN  Pain Management Interventions: medication (see MAR)  Goal: Readiness for Transition of Care  Outcome: Progressing     Problem: Fall Injury Risk  Goal: Absence of Fall and Fall-Related Injury  Outcome: Progressing  Intervention: Identify and Manage Contributors  Recent Flowsheet Documentation  Taken 4/28/2024 2217 by Brisa Greenberg RN  Medication Review/Management: medications reviewed  Intervention: Promote Injury-Free Environment  Recent Flowsheet Documentation  Taken 4/28/2024 2217 by Brisa Greenberg RN  Safety Promotion/Fall Prevention: activity supervised     Problem: Gas Exchange Impaired  Goal: Optimal Gas Exchange  Outcome: Progressing     Problem: Fatigue  Goal: Improved Activity Tolerance  Outcome: Progressing     Problem: Comorbidity Management  Goal: Maintenance of Heart Failure Symptom Control  Outcome: Progressing  Intervention: Maintain Heart Failure Management  Recent Flowsheet Documentation  Taken 4/28/2024 2217 by Brisa Greenberg RN  Medication Review/Management: medications reviewed     Goal Outcome Evaluation:      Plan of Care Reviewed With: patient    Overall Patient Progress: no changeOverall Patient Progress: no change    Outcome Evaluation: frequent coughing / low BP

## 2024-04-29 NOTE — PROGRESS NOTES
PRIMARY DIAGNOSIS:  RSV  OUTPATIENT/OBSERVATION GOALS TO BE MET BEFORE DISCHARGE:  1. Vital signs stable: Yes    2. Improvement of peak flow to greater than 70% sustained off nebulizer for 4 hours:  na    3. Dyspnea improved and O2 sats >88% at RA or at prior home O2 therapy level: Yes      SpO2: 90 %, O2 Device: Nasal cannula    4. Short term supplemental O2 needed for use with activity at home: No    5. Tolerating adequate PO diet and medications: Yes    6. Return to near baseline physical activity: Yes    Discharge Planner Nurse   Safe discharge environment identified: Yes  Barriers to discharge: Yes       Entered by: Amanda Galindo RN 04/29/2024 9:47 AM     Please review provider order for any additional goals.   Nurse to notify provider when observation goals have been met and patient is ready for discharge.

## 2024-04-29 NOTE — PLAN OF CARE
"  Problem: Adult Inpatient Plan of Care  Goal: Plan of Care Review  Description: The Plan of Care Review/Shift note should be completed every shift.  The Outcome Evaluation is a brief statement about your assessment that the patient is improving, declining, or no change.  This information will be displayed automatically on your shift  note.  Outcome: Progressing  Flowsheets (Taken 4/29/2024 7636)  Plan of Care Reviewed With: patient  Overall Patient Progress: improving  Goal: Patient-Specific Goal (Individualized)  Description: You can add care plan individualizations to a care plan. Examples of Individualization might be:  \"Parent requests to be called daily at 9am for status\", \"I have a hard time hearing out of my right ear\", or \"Do not touch me to wake me up as it startles  me\".  Outcome: Progressing  Goal: Absence of Hospital-Acquired Illness or Injury  Outcome: Progressing  Goal: Optimal Comfort and Wellbeing  Outcome: Progressing  Goal: Readiness for Transition of Care  Outcome: Progressing     Problem: Fall Injury Risk  Goal: Absence of Fall and Fall-Related Injury  Outcome: Progressing     Problem: Gas Exchange Impaired  Goal: Optimal Gas Exchange  Outcome: Progressing     Problem: Fatigue  Goal: Improved Activity Tolerance  Outcome: Progressing     Problem: Comorbidity Management  Goal: Maintenance of Heart Failure Symptom Control  Outcome: Progressing   Goal Outcome Evaluation:      Plan of Care Reviewed With: patient    Overall Patient Progress: improvingOverall Patient Progress: improving           "

## 2024-04-29 NOTE — PROGRESS NOTES
[facility]    Hospitalist Progress Note  Name: Cathleen Ty    MRN: [id number]  Provider:  Coleman Samano DO  Date of Service: 04/29/2024    Summary of Stay: Cathleen Ty is a 73 year old female with a history of HFrEF with most recent EF 33%, breast cancer with metastasis to bone and follows with Dr. Stiles of oncology [facility] currently on Ibrance, type 2 diabetes mellitus with insulin dependence, BEN, fibromyalgia, hyperlipidemia, chronic kidney disease, GERD, SSS status post biventricular pacemaker admitted on 4/27/2024 with generalized weakness, upper respiratory symptoms.  In the emergency department, the patient is found to have a blood pressure of 102/56, heart rate 68, temperature 98.2  F, respiratory rate 18, SpO2 98% on room air.  Initial lab work showed chloride 96, BUN/creatinine 21.1/1.16, proBNP 1944, glucose 133, leukopenia of 3.5, thrombocytopenia 84, procalcitonin 0.15.  Urinalysis was negative for signs of infection.  The patient tested positive for RSV.  Chest x-ray showed cardiomegaly with apparent right lung haziness that is favored to be artifactual/overlapping structures.  Physical therapy and occupational therapy were consulted to see the patient.    TODAY'S PLAN: Patient today lost her voice.  Coughing seems to have improved though.  All of this is typical for an RSV infection.  Patient reports she feels she is unable to take care of herself at home.  Plan for PT evaluation this morning and will ask for OT evaluation as well.  Discussed with patient as well as her daughter via phone the patient's observation status and concern for financial cost of further hospitalization.  We also discussed that at this point there is no need for IV medications, further procedures or testing so once PT/OT evaluation is complete the patient would be medically stable for discharge.  They expressed understanding.  They expressed some interest in TCU as [facility]  Anticipate patient will be stable for discharge after PT/OT evaluation.  Appreciate social work assistance with discharge planning.    Problem List:   Generalized Weakness  RSV Infection  - Appreciate ID recommendations  - Symptom management  - Antitussives  - Appreciate PT/OT recommendations     Pancytopenia  Breast CA with Mets to Bone  - Follows with Dr. Stiles of [facility].  Currently on Ibrance  - Hold ASA 81 mg daily and Ibrance for now  - Likely due to acute viral infection  - Daily CBC     Chronic Medical Problems:  HFrEF  Type 2 Diabetes Mellitus  BEN without CPAP  Fibromyalgia  Hyperlipidemia  Chronic Kidney Disease  SSS s/p PPM  GERD    Clinically Significant Risk Factors Present on Admission                # Drug Induced Platelet Defect: home medication list includes an antiplatelet medication        # Obesity: Estimated body mass index is 34.39 kg/m  as calculated from the following:    Height as of this encounter: 1.524 m (5').    Weight as of this encounter: 79.9 kg (176 lb 1.6 oz).               I spent 46 minutes in reviewing this patient's labs, imaging, medications, medical history.  In addition time was spent interviewing the patient, communicating with family, and medical decision making.     DVT Prophylaxis: Pneumatic Compression Devices  Code Status: Special Code  Diet: Combination Diet Moderate Consistent Carb (60 g CHO per Meal) Diet; 3 gm NA Diet    Pelaez Catheter: Not present    Disposition: Medically Ready for Discharge: Anticipated Today    Goals to discharge include: PT/OT evaluation complete  Family updated today: Yes      Interval History   Pt seen and examined.  Pt reports losing her voice this morning.  Appetite is ok.    -Data reviewed today: I personally reviewed all new labs and imaging results over the last 24 hours.     Physical Exam   Temp: 98.8  F (37.1  C) Temp src: Oral BP: 102/58 Pulse: 104   Resp: 16 SpO2: 90 % O2 Device: Nasal  cannula Oxygen Delivery: 1 LPM  Vitals:    04/27/24 1201 04/28/24 0831   Weight: 79.3 kg (174 lb 13.2 oz) 79.9 kg (176 lb 1.6 oz)     Vital Signs with Ranges  Temp:  [97.3  F (36.3  C)-98.8  F (37.1  C)] 98.8  F (37.1  C)  Pulse:  [] 104  Resp:  [16-20] 16  BP: ()/(51-66) 102/58  SpO2:  [88 %-96 %] 90 %  I/O last 3 completed shifts:  In: 800 [P.O.:800]  Out: 400 [Urine:400]    GENERAL: No apparent distress. Awake, alert, and fully oriented.  HEENT: Normocephalic, atraumatic. Extraocular movements intact.  CARDIOVASCULAR: Regular rate and rhythm without murmurs or rubs. No S3.  PULMONARY: Clear bilaterally.  GASTROINTESTINAL: Soft, non-tender, non-distended. Bowel sounds normoactive.   EXTREMITIES: No cyanosis or clubbing. No edema.  NEUROLOGICAL: CN 2-12 grossly intact, no focal neurological deficits.  DERMATOLOGICAL: No rash, ulcer, bruising, nor jaundice.    Medications   Current Facility-Administered Medications   Medication Dose Route Frequency Provider Last Rate Last Admin     Current Facility-Administered Medications   Medication Dose Route Frequency Provider Last Rate Last Admin    acyclovir (ZOVIRAX) tablet 400 mg  400 mg Oral Daily Coleman Samano DO   400 mg at 04/29/24 0817    cetirizine (zyrTEC) tablet 10 mg  10 mg Oral Daily Coleman Samano DO   10 mg at 04/29/24 0817    DULoxetine (CYMBALTA) DR capsule 30 mg  30 mg Oral Daily Coleman Samano DO   30 mg at 04/29/24 0817    finasteride (PROSCAR) half-tab 2.5 mg  2.5 mg Oral Daily Coleman Samano DO   2.5 mg at 04/29/24 0819    lactated ringers BOLUS 500 mL  500 mL Intravenous Once Jaycob Antunez, TRISTAN CNP 1,000 mL/hr at 04/27/24 2201 Rate Change at 04/27/24 2201    letrozole (FEMARA) tablet 2.5 mg  2.5 mg Oral Daily Coleman Samano DO   2.5 mg at 04/29/24 0817    montelukast (SINGULAIR) tablet 10 mg  10 mg Oral At Bedtime Coleman Samano DO   10 mg at 04/28/24 2104    OLANZapine (zyPREXA) tablet  "5-10 mg  5-10 mg Oral At Bedtime Coleman Samano, DO   10 mg at 04/28/24 2104    pantoprazole (PROTONIX) EC tablet 40 mg  40 mg Oral Daily Coleman Samano, DO   40 mg at 04/29/24 0817    pregabalin (LYRICA) capsule 150 mg  150 mg Oral BID Coleman Samano, DO   150 mg at 04/29/24 0817    rosuvastatin (CRESTOR) tablet 20 mg  20 mg Oral At Bedtime Coleman Samano, DO   20 mg at 04/28/24 2103    sodium chloride (PF) 0.9% PF flush 3 mL  3 mL Intracatheter Q8H Kim Mansfield PA-C   3 mL at 04/29/24 0557     Data     Laboratory:  Recent Labs   Lab 04/29/24  0833 04/28/24  0708 04/27/24  0732   WBC 4.0 3.6* 3.5*   HGB 11.6* 9.8* 12.2   HCT 34.0* 28.8* 35.4   * 101* 100   PLT 61* 60* 84*     Recent Labs   Lab 04/29/24  0940 04/29/24  0833 04/28/24  2224 04/28/24  0747 04/28/24  0708 04/27/24  1515 04/27/24  0732   NA  --  144  --   --  142  --  135   POTASSIUM  --  4.1  --   --  3.7  --  3.5   CHLORIDE  --  110*  --   --  105  --  96*   CO2  --  22  --   --  23  --  25   ANIONGAP  --  12  --   --  14  --  14   * 119* 119*   < > 122*   < > 133*   BUN  --  15.7  --   --  17.7  --  21.1   CR  --  0.80  --   --  0.92  --  1.16*   GFRESTIMATED  --  77  --   --  65  --  49*   BUD  --  9.6  --   --  9.0  --  8.9    < > = values in this interval not displayed.     No results for input(s): \"CULT\" in the last 168 hours.  No results found for: \"TROPI\"    Imaging:  No results found for this or any previous visit (from the past 24 hour(s)).      Coleamn Samano DO  [facility] Hospitalist  [address] [place] [address]  Securely message with [employer] (more info)  Text page via [employer] Paging/Directory   04/29/2024   "

## 2024-04-29 NOTE — PLAN OF CARE
Goal Outcome Evaluation:       PRIMARY DIAGNOSIS: RSV  OUTPATIENT/OBSERVATION GOALS TO BE MET BEFORE DISCHARGE:  1. Vital signs stable: Yes           3. Dyspnea improved and O2 sats >88% at RA or at prior home O2 therapy level: Yes      SpO2: 90 %, O2 Device: Nasal cannula 1LPM     4. Short term supplemental O2 needed for use with activity at home:yes  5. Tolerating adequate PO diet and medications: Yes     6. Return to near baseline physical activity: No        Discharge Planner Nurse  Safe discharge environment identified: Yes  Barriers to discharge: Yes.  /68 (BP Location: Right arm)   Pulse 101   Temp 97.5  F (36.4  C) (Oral)   Resp 16   Ht 1.524 m (5')   Wt 79.9 kg (176 lb 1.6 oz)   SpO2 97%   BMI 34.39 kg/m         Problem: Adult Inpatient Plan of Care  Goal: Absence of Hospital-Acquired Illness or Injury  Intervention: Identify and Manage Fall Risk  Recent Flowsheet Documentation  Taken 4/29/2024 1536 by Carl Griffin RN  Safety Promotion/Fall Prevention: activity supervised  Intervention: Prevent Skin Injury  Recent Flowsheet Documentation  Taken 4/29/2024 1536 by Carl Griffin RN  Body Position: position changed independently  Intervention: Prevent and Manage VTE (Venous Thromboembolism) Risk  Recent Flowsheet Documentation  Taken 4/29/2024 1536 by Carl Griffin RN  VTE Prevention/Management: SCDs (sequential compression devices) off  Intervention: Prevent Infection  Recent Flowsheet Documentation  Taken 4/29/2024 1536 by Carl Griffin RN  Infection Prevention:   rest/sleep promoted   single patient room provided   hand hygiene promoted

## 2024-04-29 NOTE — PROGRESS NOTES
[facility]  OUTPATIENT PHYSICAL THERAPY EVALUATION  PLAN OF TREATMENT FOR OUTPATIENT REHABILITATION  (COMPLETE FOR INITIAL CLAIMS ONLY)  Patient's Last Name, First Name, M.I.  YOB: 1950  Cathleen Ty                        Provider's Name  [facility] Medical Record No.  [id number]                             Onset Date:  04/27/24   Start of Care Date:  04/29/24   Type:     _X_PT   ___OT   ___SLP Medical Diagnosis:  RSV, gen weakness              PT Diagnosis:  impaired IND with functional mobility Visits from SOC:  1     See note for plan of treatment, functional goals and certification details    I CERTIFY THE NEED FOR THESE SERVICES FURNISHED UNDER        THIS PLAN OF TREATMENT AND WHILE UNDER MY CARE     (Physician co-signature of this document indicates review and certification of the therapy plan).              04/29/24 1400   Appointment Info   Signing Clinician's Name / Credentials (PT) Trisha Cervantes DPT   Quick Adds   Quick Adds Certification   Living Environment   People in Home alone   Current Living Arrangements apartment   Home Accessibility no concerns   Transportation Anticipated family or friend will provide   Self-Care   Usual Activity Tolerance moderate   Current Activity Tolerance fair   Equipment Currently Used at Home cane, straight;walker, rolling;grab bar, toilet;grab bar, tub/shower;shower chair   Fall history within last six months yes   Number of times patient has fallen within last six months 1  (pt reports one fall, later states she had gotten down onto the ground and was unable to get back up)   Activity/Exercise/Self-Care Comment Pt typically uses SEC for most mobility, reports using walker only to take out trash. Reports friend comes over once a week to assist with groceries/heavier household tasks   General Information   Onset of Illness/Injury or Date of Surgery 04/27/24   Referring Physician  "Coleman Samano, DO   Patient/Family Therapy Goals Statement (PT) pt agreeable to TCU recommendation   Pertinent History of Current Problem (include personal factors and/or comorbidities that impact the POC) \" Cathleen Ty is a 73 year old female with a history of HFrEF with most recent EF 33%, breast cancer with metastasis to bone and follows with Dr. Stiles of oncology through [facility] currently on Ibrance, type 2 diabetes mellitus with insulin dependence, BEN, fibromyalgia, hyperlipidemia, chronic kidney disease, GERD, SSS status post biventricular pacemaker admitted on 4/27/2024 with generalized weakness, upper respiratory symptoms.  In the emergency department, the patient is found to have a blood pressure of 102/56, heart rate 68, temperature 98.2  F, respiratory rate 18, SpO2 98% on room air.  Initial lab work showed chloride 96, BUN/creatinine 21.1/1.16, proBNP 1944, glucose 133, leukopenia of 3.5, thrombocytopenia 84, procalcitonin 0.15.  Urinalysis was negative for signs of infection.  The patient tested positive for RSV.  Chest x-ray showed cardiomegaly with apparent right lung haziness that is favored to be artifactual/overlapping structures.  Physical therapy and occupational therapy were consulted to see the patient.\"   Existing Precautions/Restrictions fall;oxygen therapy device and L/min  (resting on 1 lpm O2 via NC, does not wear O2 at baseline)   Cognition   Affect/Mental Status (Cognition) WFL   Orientation Status (Cognition) oriented x 3   Follows Commands (Cognition) WFL   Pain Assessment   Patient Currently in Pain No   Posture    Posture Forward head position   Range of Motion (ROM)   Range of Motion ROM is WFL   Strength (Manual Muscle Testing)   Strength Comments decreased functional LE strength and activity tolerance   Bed Mobility   Comment, (Bed Mobility) CGA sit>sup   Transfers   Comment, (Transfers) CGA sit>stand to SEC   Gait/Stairs (Locomotion)   Comment, (Gait/Stairs) " Jeffrey for 5 ft of gait with FWW, decreased pace and step length, instabilty noted   Balance   Balance Comments falls risk, currently benefits from use of AD for safe mobility   Sensory Examination   Sensory Perception Comments pt reports baseline neuropathy in feet and hands   Clinical Impression   Criteria for Skilled Therapeutic Intervention Yes, treatment indicated   PT Diagnosis (PT) impaired IND with functional mobility   Influenced by the following impairments impaired functional strength, balance, activity tolerance   Functional limitations due to impairments impaired bed mobility, transfers, ambulation   Clinical Presentation (PT Evaluation Complexity) stable   Clinical Presentation Rationale Based on current presentation, PMH, social support   Clinical Decision Making (Complexity) low complexity   Planned Therapy Interventions (PT) balance training;bed mobility training;gait training;home exercise program;patient/family education;strengthening;transfer training;progressive activity/exercise;home program guidelines   Risk & Benefits of therapy have been explained evaluation/treatment results reviewed;care plan/treatment goals reviewed;current/potential barriers reviewed;risks/benefits reviewed;participants voiced agreement with care plan;participants included;patient   PT Total Evaluation Time   PT Eval, Low Complexity Minutes (35422) 10   Therapy Certification   Start of care date 04/29/24   Certification date from 04/29/24   Certification date to 05/06/24   Medical Diagnosis RSV, gen weakness   Physical Therapy Goals   PT Frequency 5x/week   PT Predicted Duration/Target Date for Goal Attainment 05/06/24   PT Goals Bed Mobility;Transfers;Gait   PT: Bed Mobility Modified independent;Supine to/from sit   PT: Transfers Modified independent;Sit to/from stand;Bed to/from chair;Assistive device   PT: Gait Modified independent;Assistive device;Rolling walker;150 feet   Interventions   Interventions Quick Adds Gait  Training;Therapeutic Activity;Therapeutic Procedure   Therapeutic Procedure/Exercise   Ther. Procedure: strength, endurance, ROM, flexibillity Minutes (82902) 3   Treatment Detail/Skilled Intervention Pt completed standing march bouts 2 x30s for functional endurance benefits. Completed CGA with use of FWW. requires seated rest between bouts due to fatigue   Therapeutic Activity   Therapeutic Activities: dynamic activities to improve functional performance Minutes (77600) 8   Symptoms Noted During/After Treatment Fatigue;Shortness of breath;Significant change in vital signs   Treatment Detail/Skilled Intervention Pt resting on 1 L O2, weaned to RA to trial mobility. Pt with increased SOB with mobility, max desat 85% though question accuracy as reading quickly jumps up to 90%. SpO2 hovering ~90% after mobility, returned to 1 L O2. Sit<>stands throughout session CGA to FWW. Pt attempting to pull from walker, requires continued cues for carryover of safe hand placement. Pt requests to return to bed at end of session. CGA for sit>sup. Remained supine with alarm armed and needs in reach.   Gait Training   Gait Training Minutes (21965) 12   Symptoms Noted During/After Treatment (Gait Training) fatigue;shortness of breath   Treatment Detail/Skilled Intervention Pt cued for gait with SEC on first bout, Jeffrey.1 LOB where pt notes she had bumped her SEC into her foot, modA needed for safety. Decreased step length and pace. Second bout of gait with FWW and CGA. Pt with improved pace and stability when using FWW. Pt reports she would be unable to use FWW within her apt as it is too narrow.   Distance in Feet 30'x2   PT Discharge Planning   PT Plan progress gait with FWW, monitor O2, sit<>Stand reps, activity tolerance tasks   PT Discharge Recommendation (DC Rec) Transitional Care Facility   PT Rationale for DC Rec Pt appears to be below reported baseline for mobility, currently Ax1 with use of FWW for mobility. Pt lives alone and  usually uses SEC for mobility. Presents with deficits in functional strength, balance, activity tolerance and would benefit from continued skilled PT at TCU to address these deficits.   PT Brief overview of current status Ax1 FWW   Total Session Time   Timed Code Treatment Minutes 23   Total Session Time (sum of timed and untimed services) 33

## 2024-04-29 NOTE — CONSULTS
Care Management Initial Consult    General Information  Assessment completed with: Patient,    Type of CM/SW Visit: Initial Assessment    Primary Care Provider verified and updated as needed: Yes   Readmission within the last 30 days: no previous admission in last 30 days      Reason for Consult: discharge planning    Communication Assessment  Patient's communication style: spoken language (English or Bilingual)    Hearing Difficulty or Deaf: no      Cognitive  Cognitive/Neuro/Behavioral: .WDL except  Level of Consciousness: alert  Arousal Level: opens eyes spontaneously  Orientation: oriented x 4  Mood/Behavior: anxious  Best Language: 0 - No aphasia  Speech: hoarse    Living Environment:   People in home: alone     Current living Arrangements: apartment      Able to return to prior arrangements: no     Family/Social Support:  Care provided by: self  Provides care for: no one  Marital Status:   Other (specify), Children (Friends)          Description of Support System: Supportive, Involved    Support Assessment: Adequate family and caregiver support    Current Resources:   Patient receiving home care services: No  Community Resources: None  Equipment currently used at home: cane, straight, walker, rolling, grab bar, toilet, grab bar, tub/shower, shower chair  Supplies currently used at home: None    Lifestyle & Psychosocial Needs:  Social Determinants of Health     Food Insecurity: Not on file   Depression: At risk (11/24/2023)    Received from [facility]    PHQ-2     PHQ-2 Score: 4   Housing Stability: Not on file   Tobacco Use: Medium Risk (3/17/2024)    Received from [facility]    Patient History     Smoking Tobacco Use: Former     Smokeless Tobacco Use: Never     Passive Exposure: Not on file   Financial Resource Strain: High Risk (12/22/2021)    [facility]    Financial Resource Strain     Difficulty of Paying Living Expenses: Not on file      Difficulty of Paying Living Expenses: Not on file   Alcohol Use: Not on file   Transportation Needs: Not on file   Physical Activity: Not on file   Interpersonal Safety: Not on file   Stress: Not on file   Social Connections: Unknown (12/22/2021)    Received from [facility]    Social Connections     Frequency of Communication with Friends and Family: Not on file   Health Literacy: Not on file     Functional Status:  Prior to admission patient needed assistance:   Dependent ADLs:: Ambulation-cane  Dependent IADLs:: Cleaning, Transportation  Assesssment of Functional Status: Needs placement in a SNF/[facility] for rehabilitation    Additional Information:  CM consulted for discharge planning, spoke with pt to assess. She lives alone in an apartment and is independent with a cane at baseline. She gets help with cleaning and transportation but is otherwise independent with all ADLs/IADLs. Reviewed PT recommendations for TCU at discharge, pt in agreement with this. She would like referrals sent to 1) [facility] 2) [facility] 3) [facility], referrals sent, awaiting response.     Will need to obtain insurance authorization for TCU once accepting facility found.     Mariann Lopez RN BSN   Inpatient Care Coordination  [facility]   Phone [phone]

## 2024-04-29 NOTE — PLAN OF CARE
"  Problem: Adult Inpatient Plan of Care  Goal: Plan of Care Review  Description: The Plan of Care Review/Shift note should be completed every shift.  The Outcome Evaluation is a brief statement about your assessment that the patient is improving, declining, or no change.  This information will be displayed automatically on your shift  note.  4/29/2024 1306 by Amanda Galindo RN  Outcome: Progressing  Flowsheets (Taken 4/29/2024 1306)  Outcome Evaluation: pt vitals are stable  Plan of Care Reviewed With: patient  Overall Patient Progress: improving  4/29/2024 0936 by Amanda Galindo RN  Outcome: Progressing  Flowsheets (Taken 4/29/2024 0936)  Plan of Care Reviewed With: patient  Overall Patient Progress: improving  Goal: Patient-Specific Goal (Individualized)  Description: You can add care plan individualizations to a care plan. Examples of Individualization might be:  \"Parent requests to be called daily at 9am for status\", \"I have a hard time hearing out of my right ear\", or \"Do not touch me to wake me up as it startles  me\".  4/29/2024 1306 by Amanda Galindo RN  Outcome: Progressing  4/29/2024 0936 by Amanda Galindo RN  Outcome: Progressing  Goal: Absence of Hospital-Acquired Illness or Injury  4/29/2024 1306 by Amanda Galindo RN  Outcome: Progressing  4/29/2024 0936 by Amanda Galindo RN  Outcome: Progressing  Intervention: Identify and Manage Fall Risk  Recent Flowsheet Documentation  Taken 4/29/2024 1100 by Amanda Galindo RN  Safety Promotion/Fall Prevention: activity supervised  Intervention: Prevent Skin Injury  Recent Flowsheet Documentation  Taken 4/29/2024 1100 by Amanda Galindo RN  Body Position: position changed independently  Intervention: Prevent and Manage VTE (Venous Thromboembolism) Risk  Recent Flowsheet Documentation  Taken 4/29/2024 1100 by Amanda Galindo RN  VTE Prevention/Management: SCDs (sequential compression devices) off  Intervention: Prevent " Infection  Recent Flowsheet Documentation  Taken 4/29/2024 1100 by Amanda Galindo RN  Infection Prevention:   rest/sleep promoted   single patient room provided   hand hygiene promoted  Goal: Optimal Comfort and Wellbeing  4/29/2024 1306 by Amanda Galindo RN  Outcome: Progressing  4/29/2024 0936 by Amanda Galindo RN  Outcome: Progressing  Goal: Readiness for Transition of Care  4/29/2024 1306 by Amanda Galindo RN  Outcome: Progressing  4/29/2024 0936 by Amanda Galindo RN  Outcome: Progressing     Problem: Fall Injury Risk  Goal: Absence of Fall and Fall-Related Injury  4/29/2024 1306 by Amanda Galindo RN  Outcome: Progressing  4/29/2024 0936 by Amanda Galindo RN  Outcome: Progressing  Intervention: Identify and Manage Contributors  Recent Flowsheet Documentation  Taken 4/29/2024 1100 by Amanda Galindo RN  Medication Review/Management: medications reviewed  Intervention: Promote Injury-Free Environment  Recent Flowsheet Documentation  Taken 4/29/2024 1100 by Amanda Galindo RN  Safety Promotion/Fall Prevention: activity supervised     Problem: Gas Exchange Impaired  Goal: Optimal Gas Exchange  4/29/2024 1306 by Amanda Galindo RN  Outcome: Progressing  4/29/2024 0936 by Amanda Galindo RN  Outcome: Progressing  Intervention: Optimize Oxygenation and Ventilation  Recent Flowsheet Documentation  Taken 4/29/2024 1100 by Amanda Galindo RN  Head of Bed (HOB) Positioning: HOB at 20-30 degrees     Problem: Fatigue  Goal: Improved Activity Tolerance  4/29/2024 1306 by Amanda Galindo RN  Outcome: Progressing  4/29/2024 0936 by Amanda Galindo RN  Outcome: Progressing  Intervention: Promote Improved Energy  Recent Flowsheet Documentation  Taken 4/29/2024 1100 by Amanda Galindo RN  Activity Management: activity adjusted per tolerance     Problem: Comorbidity Management  Goal: Maintenance of Heart Failure Symptom Control  4/29/2024 1306 by Amanda Galindo  RN  Outcome: Progressing  4/29/2024 0936 by Amanda Galindo RN  Outcome: Progressing  Intervention: Maintain Heart Failure Management  Recent Flowsheet Documentation  Taken 4/29/2024 1100 by Amanda Galindo RN  Medication Review/Management: medications reviewed   PRIMARY DIAGNOSIS: RSV  OUTPATIENT/OBSERVATION GOALS TO BE MET BEFORE DISCHARGE:  1. Vital signs stable: Yes        3. Dyspnea improved and O2 sats >88% at RA or at prior home O2 therapy level: Yes      SpO2: 90 %, O2 Device: Nasal cannula    4. Short term supplemental O2 needed for use with activity at home:yes  5. Tolerating adequate PO diet and medications: Yes    6. Return to near baseline physical activity: No    Discharge Planner Nurse   Safe discharge environment identified: Yes  Barriers to discharge: Yes       Entered by: Amanda Galindo RN 04/29/2024 1:07 PM     Please review provider order for any additional goals.   Nurse to notify provider when observation goals have been met and patient is ready for discharge.Goal Outcome Evaluation:      Plan of Care Reviewed With: patient    Overall Patient Progress: improvingOverall Patient Progress: improving    Outcome Evaluation: pt vitals are stable

## 2024-04-29 NOTE — UTILIZATION REVIEW
"[facility] Utilization Review  Admission Status; Secondary Review Determination     Admission Date: 4/27/2024  7:08 AM      Under the authority of the Utilization Management Committee, the utilization review process indicated a secondary review on the above patient.  The review outcome is based on review of the medical records, discussions with staff, and applying clinical experience noted on the date of the review.        (X) Observation Status Appropriate - This patient does not meet hospital inpatient criteria and is placed in observation status. If this patient's primary payer is Medicare and was admitted as an inpatient, Condition Code 44 should be used and patient status changed to \"observation\".   () Observation Status concurrent Review           RATIONALE FOR DETERMINATION   73-year-old female with history of HFrEF, EF 33%, breast cancer with metastases to bone, currently on Ibrance, diabetes mellitus, BEN, fibromyalgia, CKD, GERD, SSS status post biventricular pacemaker admitted with RSV infection and generalized weakness.  No hypoxia, urinalysis clear, chest x-ray shows right lung haziness thought to be artifact, PT, OT evaluated patient and recommend TCU placement, patient appears to be overall improving, does not meet criteria for inpatient stay, recommend continue observation status      The severity of illness, intensity of service provided, expected LOS make the care appropriate for observation status at this time.        The information on this document is developed by the utilization review team in order for the business office to ensure compliance.  This only denotes the appropriateness of proper admission status and does not reflect the quality of care rendered.              Sincerely,       Roland Solis MD  Physician Advisor  Utilization Review-[place]    Phone: [phone]     "

## 2024-04-29 NOTE — PLAN OF CARE
"PRIMARY DIAGNOSIS: RSV  OUTPATIENT/OBSERVATION GOALS TO BE MET BEFORE DISCHARGE:  Dyspnea improved and O2 sats >88% on RA or back to baseline O2 levels: No   SpO2: 95 %, O2 Device: Nasal cannula    Tolerating oral abx or appropriate plans made outpatient infusion:  NA    Vitals signs normal or return to baseline: No    Short term supplemental O2 needed with activity at home:  No    Tolerate oral intake to maintain hydration: Yes    Return to near baseline physical activity: No    Discharge Planner Nurse   Safe discharge environment identified: No  Barriers to discharge: Yes       Entered by: Brisa Greenberg RN 04/29/2024 4:10 AM     Managed to sleep in between cares, frequent cough noted PRN cough medication given. Remained on oxygen at 2LPM/NC. ID consulted. Plan PT input/consult.    Please review provider order for any additional goals.   Nurse to notify provider when observation goals have been met and patient is ready for discharge.  Problem: Adult Inpatient Plan of Care  Goal: Plan of Care Review  Description: The Plan of Care Review/Shift note should be completed every shift.  The Outcome Evaluation is a brief statement about your assessment that the patient is improving, declining, or no change.  This information will be displayed automatically on your shift  note.  4/29/2024 0409 by Brisa Greenberg RN  Outcome: Progressing  Flowsheets (Taken 4/29/2024 0409)  Outcome Evaluation: frequent coughing  Plan of Care Reviewed With: patient  Overall Patient Progress: no change  4/29/2024 0231 by Brisa Greenberg RN  Outcome: Progressing  Flowsheets (Taken 4/29/2024 0231)  Plan of Care Reviewed With: patient  Overall Patient Progress: no change  Goal: Patient-Specific Goal (Individualized)  Description: You can add care plan individualizations to a care plan. Examples of Individualization might be:  \"Parent requests to be called daily at 9am for status\", \"I have a hard time hearing out of my right ear\", or \"Do " "not touch me to wake me up as it startles  me\".  4/29/2024 0409 by Brisa Greenberg RN  Outcome: Progressing  4/29/2024 0231 by Brisa Greenberg RN  Outcome: Progressing  Goal: Absence of Hospital-Acquired Illness or Injury  4/29/2024 0409 by Brisa Greenberg RN  Outcome: Progressing  4/29/2024 0231 by Brisa Greenberg RN  Outcome: Progressing  Intervention: Identify and Manage Fall Risk  Recent Flowsheet Documentation  Taken 4/28/2024 2217 by Brisa Greenberg RN  Safety Promotion/Fall Prevention: activity supervised  Intervention: Prevent and Manage VTE (Venous Thromboembolism) Risk  Recent Flowsheet Documentation  Taken 4/28/2024 2217 by Brisa Greenberg RN  VTE Prevention/Management: SCDs (sequential compression devices) off  Intervention: Prevent Infection  Recent Flowsheet Documentation  Taken 4/28/2024 2217 by Brisa Greenberg RN  Infection Prevention:   rest/sleep promoted   single patient room provided   hand hygiene promoted  Goal: Optimal Comfort and Wellbeing  4/29/2024 0409 by Brisa Greenberg RN  Outcome: Progressing  4/29/2024 0231 by Brisa Greenberg RN  Outcome: Progressing  Intervention: Monitor Pain and Promote Comfort  Recent Flowsheet Documentation  Taken 4/28/2024 2157 by Brisa Greenberg RN  Pain Management Interventions: medication (see MAR)  Goal: Readiness for Transition of Care  4/29/2024 0409 by Brisa Greenberg RN  Outcome: Progressing  4/29/2024 0231 by Brisa Greenberg RN  Outcome: Progressing     Problem: Fall Injury Risk  Goal: Absence of Fall and Fall-Related Injury  4/29/2024 0409 by Brisa Greenberg RN  Outcome: Progressing  4/29/2024 0231 by Brisa Greenberg RN  Outcome: Progressing  Intervention: Identify and Manage Contributors  Recent Flowsheet Documentation  Taken 4/28/2024 2217 by Brisa Greenberg RN  Medication Review/Management: medications reviewed  Intervention: Promote Injury-Free Environment  Recent Flowsheet Documentation  Taken 4/28/2024 " 2217 by Brisa Greenberg RN  Safety Promotion/Fall Prevention: activity supervised     Problem: Gas Exchange Impaired  Goal: Optimal Gas Exchange  4/29/2024 0409 by Brisa Greenberg RN  Outcome: Progressing  4/29/2024 0231 by Brisa Greenberg RN  Outcome: Progressing     Problem: Fatigue  Goal: Improved Activity Tolerance  4/29/2024 0409 by Brisa Greenberg RN  Outcome: Progressing  4/29/2024 0231 by Brisa Greenberg RN  Outcome: Progressing     Problem: Comorbidity Management  Goal: Maintenance of Heart Failure Symptom Control  4/29/2024 0409 by Brisa Greenberg RN  Outcome: Progressing  4/29/2024 0231 by Brisa Greenberg RN  Outcome: Progressing  Intervention: Maintain Heart Failure Management  Recent Flowsheet Documentation  Taken 4/28/2024 2217 by Brisa Greenberg RN  Medication Review/Management: medications reviewed     Goal Outcome Evaluation:      Plan of Care Reviewed With: patient    Overall Patient Progress: no changeOverall Patient Progress: no change    Outcome Evaluation: frequent coughing

## 2024-04-30 LAB
GLUCOSE BLDC GLUCOMTR-MCNC: 103 MG/DL (ref 70–99)
GLUCOSE BLDC GLUCOMTR-MCNC: 142 MG/DL (ref 70–99)
GLUCOSE BLDC GLUCOMTR-MCNC: 156 MG/DL (ref 70–99)
GLUCOSE BLDC GLUCOMTR-MCNC: 165 MG/DL (ref 70–99)
GLUCOSE BLDC GLUCOMTR-MCNC: 230 MG/DL (ref 70–99)

## 2024-04-30 PROCEDURE — 250N000013 HC RX MED GY IP 250 OP 250 PS 637: Performed by: INTERNAL MEDICINE

## 2024-04-30 PROCEDURE — 250N000013 HC RX MED GY IP 250 OP 250 PS 637: Performed by: PHYSICIAN ASSISTANT

## 2024-04-30 PROCEDURE — 82962 GLUCOSE BLOOD TEST: CPT

## 2024-04-30 PROCEDURE — 99213 OFFICE O/P EST LOW 20 MIN: CPT | Performed by: INTERNAL MEDICINE

## 2024-04-30 PROCEDURE — G0378 HOSPITAL OBSERVATION PER HR: HCPCS

## 2024-04-30 PROCEDURE — 250N000013 HC RX MED GY IP 250 OP 250 PS 637: Performed by: STUDENT IN AN ORGANIZED HEALTH CARE EDUCATION/TRAINING PROGRAM

## 2024-04-30 PROCEDURE — 99232 SBSQ HOSP IP/OBS MODERATE 35: CPT | Performed by: PHYSICIAN ASSISTANT

## 2024-04-30 RX ORDER — SPIRONOLACTONE 25 MG
12.5 TABLET ORAL DAILY
Status: DISCONTINUED | OUTPATIENT
Start: 2024-05-01 | End: 2024-05-01 | Stop reason: HOSPADM

## 2024-04-30 RX ADMIN — PREGABALIN 150 MG: 75 CAPSULE ORAL at 20:30

## 2024-04-30 RX ADMIN — ROSUVASTATIN 20 MG: 10 TABLET, FILM COATED ORAL at 20:30

## 2024-04-30 RX ADMIN — MONTELUKAST 10 MG: 10 TABLET, FILM COATED ORAL at 20:30

## 2024-04-30 RX ADMIN — GUAIFENESIN SYRUP AND DEXTROMETHORPHAN 10 ML: 100; 10 SYRUP ORAL at 14:14

## 2024-04-30 RX ADMIN — CETIRIZINE HYDROCHLORIDE 10 MG: 10 TABLET, FILM COATED ORAL at 08:52

## 2024-04-30 RX ADMIN — DULOXETINE HYDROCHLORIDE 30 MG: 30 CAPSULE, DELAYED RELEASE ORAL at 08:52

## 2024-04-30 RX ADMIN — GUAIFENESIN SYRUP AND DEXTROMETHORPHAN 10 ML: 100; 10 SYRUP ORAL at 08:52

## 2024-04-30 RX ADMIN — ACETAMINOPHEN 650 MG: 325 TABLET, FILM COATED ORAL at 20:29

## 2024-04-30 RX ADMIN — FINASTERIDE 2.5 MG: 5 TABLET, FILM COATED ORAL at 08:52

## 2024-04-30 RX ADMIN — PREGABALIN 150 MG: 75 CAPSULE ORAL at 08:52

## 2024-04-30 RX ADMIN — ACYCLOVIR 400 MG: 400 TABLET ORAL at 08:52

## 2024-04-30 RX ADMIN — LETROZOLE 2.5 MG: 2.5 TABLET ORAL at 08:52

## 2024-04-30 RX ADMIN — OLANZAPINE 10 MG: 5 TABLET, FILM COATED ORAL at 20:30

## 2024-04-30 RX ADMIN — TORSEMIDE 60 MG: 20 TABLET ORAL at 08:52

## 2024-04-30 RX ADMIN — PANTOPRAZOLE SODIUM 40 MG: 40 TABLET, DELAYED RELEASE ORAL at 08:52

## 2024-04-30 ASSESSMENT — ACTIVITIES OF DAILY LIVING (ADL)
ADLS_ACUITY_SCORE: 42
ADLS_ACUITY_SCORE: 46
ADLS_ACUITY_SCORE: 46
ADLS_ACUITY_SCORE: 42
ADLS_ACUITY_SCORE: 46
ADLS_ACUITY_SCORE: 42
ADLS_ACUITY_SCORE: 42
ADLS_ACUITY_SCORE: 46
ADLS_ACUITY_SCORE: 46
ADLS_ACUITY_SCORE: 42
ADLS_ACUITY_SCORE: 42
ADLS_ACUITY_SCORE: 46

## 2024-04-30 NOTE — PLAN OF CARE
PRIMARY DIAGNOSIS: RSV  OUTPATIENT/OBSERVATION GOALS TO BE MET BEFORE DISCHARGE:  1. Vital signs stable: Yes    2. Improvement of peak flow to greater than 70% sustained off nebulizer for 4 hours: Yes    3. Dyspnea improved and O2 sats >88% at RA or at prior home O2 therapy level: Yes      SpO2: 93 %, O2 Device: Nasal cannula    4. Short term supplemental O2 needed for use with activity at home: No    5. Tolerating adequate PO diet and medications: Yes    6. Return to near baseline physical activity: Yes    Discharge Planner Nurse   Safe discharge environment identified: Yes  Barriers to discharge: No       Entered by: Amanda Galindo RN 04/30/2024 9:23 AM     Please review provider order for any additional goals.   Nurse to notify provider when observation goals have been met and patient is ready for discharge.Goal Outcome Evaluation:      Plan of Care Reviewed With: patient    Overall Patient Progress: improvingOverall Patient Progress: improving    Outcome Evaluation: Pt resp statues is improving

## 2024-04-30 NOTE — PROGRESS NOTES
Care Management Follow Up    Length of Stay (days): 0    Expected Discharge Date: 04/30/2024     Concerns to be Addressed: discharge planning     Patient plan of care discussed at interdisciplinary rounds: Yes    Anticipated Discharge Disposition: Transitional Care     Anticipated Discharge Services: None  Anticipated Discharge DME: None    Patient/family educated on Medicare website which has current facility and service quality ratings: yes  Education Provided on the Discharge Plan: Yes  Patient/Family in Agreement with the Plan: yes    Referrals Placed by CM/SW: Post Acute Facilities    Additional Information:  CM following for discharge planning, no accepting facility at this time. Updated pt, will send additional referrals as close to [place] as possible. Referrals sent, awaiting response.     Update 1515: Called [facility] to complete prior authorization for TCU stay, reference # [id number]. Reviewed clinical information over the phone, pt was approved for TCU stay from 5/1 until 5/7, authorization # [id number]. CM/SW will need to call back to update with accepting TCU.     Mariann Lopez RN BSN   Inpatient Care Coordination  [facility]   Phone [phone]

## 2024-04-30 NOTE — PLAN OF CARE
Goal Outcome Evaluation:       PRIMARY DIAGNOSIS: RSV  OUTPATIENT/OBSERVATION GOALS TO BE MET BEFORE DISCHARGE:  1. Vital signs stable: Yes           3. Dyspnea improved and O2 sats >88% at RA or at prior home O2 therapy level: Yes      SpO2: 90 %, O2 Device: Nasal cannula 1LPM     4. Short term supplemental O2 needed for use with activity at home:yes  5. Tolerating adequate PO diet and medications: Yes     6. Return to near baseline physical activity: No        Discharge Planner Nurse  Safe discharge environment identified: Yes  Barriers to discharge: Yes.  BP 98/62 (BP Location: Right arm)   Pulse 94   Temp 98.1  F (36.7  C) (Oral)   Resp 16   Ht 1.524 m (5')   Wt 79.9 kg (176 lb 1.6 oz)   SpO2 98%   BMI 34.39 kg/m       Patient alert and oriented x4.VSS 1 LPM,Moderated carb tolerated well,denies pain.    Problem: Adult Inpatient Plan of Care  Goal: Absence of Hospital-Acquired Illness or Injury  Intervention: Identify and Manage Fall Risk  Recent Flowsheet Documentation  Taken 4/29/2024 1536 by Carl Griffin RN  Safety Promotion/Fall Prevention: activity supervised  Intervention: Prevent Skin Injury  Recent Flowsheet Documentation  Taken 4/29/2024 1536 by Carl Griffin RN  Body Position: position changed independently  Intervention: Prevent and Manage VTE (Venous Thromboembolism) Risk  Recent Flowsheet Documentation  Taken 4/29/2024 1536 by Carl Griffin RN  VTE Prevention/Management: SCDs (sequential compression devices) off  Intervention: Prevent Infection  Recent Flowsheet Documentation  Taken 4/29/2024 1536 by Carl Griffin RN  Infection Prevention:   rest/sleep promoted   single patient room provided   hand hygiene promoted

## 2024-04-30 NOTE — PLAN OF CARE
Goal Outcome Evaluation:      Plan of Care Reviewed With: patient    Overall Patient Progress: improvingOverall Patient Progress: improving    Outcome Evaluation: Pt resp statues is improving  PRIMARY DIAGNOSIS: RSV  OUTPATIENT/OBSERVATION GOALS TO BE MET BEFORE DISCHARGE:  1. Vital signs stable: Yes    2. Improvement of peak flow to greater than 70% sustained off nebulizer for 4 hours:  na    3. Dyspnea improved and O2 sats >88% at RA or at prior home O2 therapy level: Yes      SpO2: 94 %, O2 Device: None (Room air)    4. Short term supplemental O2 needed for use with activity at home: No    5. Tolerating adequate PO diet and medications: Yes    6. Return to near baseline physical activity: Yes    Discharge Planner Nurse   Safe discharge environment identified: Yes  Barriers to discharge: Yes       Entered by: Amanda Galindo RN 04/30/2024 2:41 PM    Awaiting accepting TCU.     Please review provider order for any additional goals.   Nurse to notify provider when observation goals have been met and patient is ready for discharge.   Burow's Advancement Flap Text: The defect edges were debeveled with a #15 scalpel blade.  Given the location of the defect and the proximity to free margins a Burow's advancement flap was deemed most appropriate.  Using a sterile surgical marker, the appropriate advancement flap was drawn incorporating the defect and placing the expected incisions within the relaxed skin tension lines where possible.    The area thus outlined was incised deep to adipose tissue with a #15 scalpel blade.  The skin margins were undermined to an appropriate distance in all directions utilizing iris scissors.

## 2024-04-30 NOTE — PROGRESS NOTES
[facility]    Medicine Progress Note - Hospitalist Service    Date of Admission:  4/27/2024    Assessment & Plan   Cathleen Ty is a 73 year old female with a history of HFrEF with most recent EF 33%, breast cancer with metastasis to bone and follows with Dr. Stiles of oncology through [facility] currently on Ibrance, type 2 diabetes mellitus with insulin dependence, BEN, fibromyalgia, hyperlipidemia, chronic kidney disease, GERD, and SSS s/p biventricular pacemaker, who was admitted on 4/27/2024 with generalized weakness and RSV infection.    ED work up was fairly unremarkable. VSS. RSV positive. CXR is clear. BNP slightly elevated and procalcitonin is low risk. Urinalysis was negative for signs of infection.  Physical therapy consulted, recommending TCU       Problem List:   Generalized Weakness  RSV Infection  Continue supportive cares, no true hypoxia noted, placed on 1L O2 overnight, unclear if truly needed. Weaned off right away this morning and has remained stable.   - ID consulted given her immunocompromised state, no RSV specific treatments recommended  - Symptom management, PRN Duonebs  - Antitussives  - PT recommending TCU  - SW assisting with placement     Pancytopenia  Breast CA with Mets to Bone  - Follows with Dr. Stiles of [facility].  Currently on Ibrance  - Hold ASA 81 mg daily and Ibrance for now  - Likely due to acute viral infection, WBC normal today  - Daily CBC     Chronic Medical Problems:  HFrEF  Has Cardiologist through . Last ECHO was 1/2024 with EF 33%. On torsemide and spironolactone at home, resumed here. Lasix remains on med list but unclear if she is supposed to be on this or not. Per med rec note, it was deleted but remains on the list. Per chart review, BP runs on the lower end  - continue torsemide, hold lasix  - spironolactone has been held since admission, will resume  Type 2 Diabetes Mellitus, diet controlled   HgbA1c 6.6 in 2/2024  BEN without  CPAP  Fibromyalgia  Hyperlipidemia  Acute on Chronic Kidney Disease  Cr 1.16 on admission, resolved with IVFs. Cr 0.8 today  SSS s/p PPM  GERD  - continue home PPI       Observation Goals: -diagnostic tests and consults completed and resulted, -vital signs normal or at patient baseline, -tolerating oral intake to maintain hydration, -infection is improving, -dyspnea improved and O2 sats greater than 88% on room air or prior home oxygen levels, -safe disposition plan has been identified, Nurse to notify provider when observation goals have been met and patient is ready for discharge.  Diet: Combination Diet Moderate Consistent Carb (60 g CHO per Meal) Diet; 3 gm NA Diet    DVT Prophylaxis: Pneumatic Compression Devices  Pelaez Catheter: Not present  Lines: None     Cardiac Monitoring: None  Code Status:  DNR, ok with intubation    Clinically Significant Risk Factors Present on Admission                # Drug Induced Platelet Defect: home medication list includes an antiplatelet medication        # Obesity: Estimated body mass index is 33.76 kg/m  as calculated from the following:    Height as of this encounter: 1.524 m (5').    Weight as of this encounter: 78.4 kg (172 lb 13.5 oz).              Disposition Plan     Medically Ready for Discharge: Ready Now           The patient's care was discussed with the Bedside Nurse, Care Coordinator/, and Patient.    Cheyanne Guillaume PA-C  Hospitalist Service  [facility]  Securely message with [employer] (more info)  Text page via [facility] Paging/Directory   ______________________________________________________________________    Interval History   Continues to note productive cough, hoarse voice. Appetite is still down but tolerating po intake. Weaned off o2    Physical Exam   Vital Signs: Temp: 98.2  F (36.8  C) Temp src: Oral BP: 115/70 Pulse: 105   Resp: 20 SpO2: 94 % O2 Device: None (Room air) Oxygen Delivery: 1 LPM  Weight: 172 lbs 13.45  oz    GENERAL:  Comfortable.  PSYCH: pleasant, oriented, No acute distress.  HEART:  Normal S1, S2 with no murmur, no pericardial rub, gallops or S3 or S4.  LUNGS:  lungs fairly clear to auscultation, normal Respiratory effort  GI:  Soft, normal bowel sounds. Non-tender, non distended.   EXTREMITIES:  No pedal edema, +2 pulses bilateral and equal.  SKIN:  Dry to touch, No rash, wound or ulcerations.  NEUROLOGIC:  grossly intact    Medical Decision Making       45 MINUTES SPENT BY ME on the date of service doing chart review, history, exam, documentation & further activities per the note.      Data         Imaging results reviewed over the past 24 hrs:   No results found for this or any previous visit (from the past 24 hour(s)).

## 2024-04-30 NOTE — PLAN OF CARE
Goal Outcome Evaluation:       PRIMARY DIAGNOSIS: RSV  OUTPATIENT/OBSERVATION GOALS TO BE MET BEFORE DISCHARGE:  1. Vital signs stable: Yes           3. Dyspnea improved and O2 sats >88% at RA or at prior home O2 therapy level: NO      SpO2: 90 %, O2 Device: 1 LPM     4. Short term supplemental O2 needed for use with activity at home:yes  5. Tolerating adequate PO diet and medications: Yes     6. Return to near baseline physical activity: No        Discharge Planner Nurse  Safe discharge environment identified: Yes  Barriers to discharge: Yes.  /70 (BP Location: Right arm)   Pulse 105   Temp 98.2  F (36.8  C) (Oral)   Resp 20   Ht 1.524 m (5')   Wt 78.4 kg (172 lb 13.5 oz)   SpO2 94%   BMI 33.76 kg/m         Problem: Adult Inpatient Plan of Care  Goal: Absence of Hospital-Acquired Illness or Injury  Intervention: Identify and Manage Fall Risk  Recent Flowsheet Documentation  Taken 4/29/2024 1536 by Carl Griffin RN  Safety Promotion/Fall Prevention: activity supervised  Intervention: Prevent Skin Injury  Recent Flowsheet Documentation  Taken 4/29/2024 1536 by Carl Griffin RN  Body Position: position changed independently  Intervention: Prevent and Manage VTE (Venous Thromboembolism) Risk  Recent Flowsheet Documentation  Taken 4/29/2024 1536 by Carl Griffin RN  VTE Prevention/Management: SCDs (sequential compression devices) off  Intervention: Prevent Infection  Recent Flowsheet Documentation  Taken 4/29/2024 1536 by Carl Griffin RN  Infection Prevention:   rest/sleep promoted   single patient room provided   hand hygiene promoted

## 2024-04-30 NOTE — PLAN OF CARE
"PRIMARY DIAGNOSIS: Weakness  OUTPATIENT/OBSERVATION GOALS TO BE MET BEFORE DISCHARGE:  1. Pain Status: Pain free.    2. Return to near baseline physical activity: No    3. Cleared for discharge by consultants (if involved): Yes    Discharge Planner Nurse   Safe discharge environment identified: Yes  Barriers to discharge: Yes        Vitals are Temp: 98.1  F (36.7  C) Temp src: Oral BP: 108/70 Pulse: 94   Resp: 16 SpO2: 93 %.  Patient is Alert and Oriented x4. They are 1 Assist with Gait Belt and Walker. Pt is on droplet precaution for RSV. Pt is a Mod carb diet.  They are denying pain.  Patient is Saline locked. ID/PT/OT- TCU referrals sent. Oxygen - 1L NC   Please review provider order for any additional goals.   Nurse to notify provider when observation goals have been met and patient is ready for discharge.  Problem: Adult Inpatient Plan of Care  Goal: Plan of Care Review  Description: The Plan of Care Review/Shift note should be completed every shift.  The Outcome Evaluation is a brief statement about your assessment that the patient is improving, declining, or no change.  This information will be displayed automatically on your shift  note.  Outcome: Progressing  Flowsheets (Taken 4/30/2024 0126)  Outcome Evaluation: TCU referrals sent  Plan of Care Reviewed With: patient  Overall Patient Progress: improving  Goal: Patient-Specific Goal (Individualized)  Description: You can add care plan individualizations to a care plan. Examples of Individualization might be:  \"Parent requests to be called daily at 9am for status\", \"I have a hard time hearing out of my right ear\", or \"Do not touch me to wake me up as it startles  me\".  Outcome: Progressing  Goal: Absence of Hospital-Acquired Illness or Injury  Outcome: Progressing  Intervention: Identify and Manage Fall Risk  Recent Flowsheet Documentation  Taken 4/30/2024 0000 by Niru Zamudio RN  Safety Promotion/Fall Prevention: activity supervised  Intervention: " Prevent and Manage VTE (Venous Thromboembolism) Risk  Recent Flowsheet Documentation  Taken 4/30/2024 0000 by Niru Zamudio RN  VTE Prevention/Management: SCDs (sequential compression devices) off  Intervention: Prevent Infection  Recent Flowsheet Documentation  Taken 4/30/2024 0000 by Niru Zamudio RN  Infection Prevention:   rest/sleep promoted   single patient room provided   hand hygiene promoted  Goal: Optimal Comfort and Wellbeing  Outcome: Progressing  Goal: Readiness for Transition of Care  Outcome: Progressing     Problem: Fall Injury Risk  Goal: Absence of Fall and Fall-Related Injury  Outcome: Progressing  Intervention: Identify and Manage Contributors  Recent Flowsheet Documentation  Taken 4/30/2024 0000 by Niru Zamudio RN  Medication Review/Management: medications reviewed  Intervention: Promote Injury-Free Environment  Recent Flowsheet Documentation  Taken 4/30/2024 0000 by Niru Zamudio RN  Safety Promotion/Fall Prevention: activity supervised     Problem: Gas Exchange Impaired  Goal: Optimal Gas Exchange  Outcome: Progressing  Intervention: Optimize Oxygenation and Ventilation  Recent Flowsheet Documentation  Taken 4/30/2024 0000 by Niru Zamudio RN  Head of Bed (HOB) Positioning: HOB at 20-30 degrees     Problem: Fatigue  Goal: Improved Activity Tolerance  Outcome: Progressing     Problem: Comorbidity Management  Goal: Maintenance of Heart Failure Symptom Control  Outcome: Progressing  Intervention: Maintain Heart Failure Management  Recent Flowsheet Documentation  Taken 4/30/2024 0000 by Niur Zamudio RN  Medication Review/Management: medications reviewed   Goal Outcome Evaluation:      Plan of Care Reviewed With: patient    Overall Patient Progress: improvingOverall Patient Progress: improving    Outcome Evaluation: TCU referrals sent

## 2024-04-30 NOTE — PROGRESS NOTES
[facility]  Infectious Disease Progress Note          Assessment and Plan:   Date of Admission:  4/27/2024  Date of Consult (When I saw the patient): 04/28/24        Assessment & Plan  Cathleen Ty is a 73 year old who was admitted on 4/27/2024.      Impression:  73-year-old with breast cancer with bone mets on Ibrance   Also history of heart failure  Diabetes  Pacemaker in place  Hypertension, CKD  Admitted with cough congestion and generalized weakness  ongoing for about a week  Tested positive for RSV  No fevers, no hypoxia says she feels slightly better than at admission         Recommendations   Supportive treatment ,specific  RSV treatment is not indicated in this situation,  has a number of symptoms likely RSV related including the laryngitis           Interval History:     no new complaints but does not feel well, temp is generally down, no signs of any secondary bacterial infection, some laryngitis and throat symptoms likely RSV also.  No new fever and feels slightly better              Medications:     Current Facility-Administered Medications   Medication Dose Route Frequency Provider Last Rate Last Admin    acyclovir (ZOVIRAX) tablet 400 mg  400 mg Oral Daily Coleman Samano DO   400 mg at 04/30/24 0852    cetirizine (zyrTEC) tablet 10 mg  10 mg Oral Daily Coleman Samano DO   10 mg at 04/30/24 0852    DULoxetine (CYMBALTA) DR capsule 30 mg  30 mg Oral Daily Coleman Samano DO   30 mg at 04/30/24 0852    finasteride (PROSCAR) half-tab 2.5 mg  2.5 mg Oral Daily Coleman Samano DO   2.5 mg at 04/30/24 0852    letrozole (FEMARA) tablet 2.5 mg  2.5 mg Oral Daily Coleman Samano DO   2.5 mg at 04/30/24 0852    montelukast (SINGULAIR) tablet 10 mg  10 mg Oral At Bedtime Coleman Samano DO   10 mg at 04/29/24 2013    OLANZapine (zyPREXA) tablet 5-10 mg  5-10 mg Oral At Bedtime Coleman Samano DO   10 mg at 04/29/24 2013    pantoprazole  "(PROTONIX) EC tablet 40 mg  40 mg Oral Daily Coleman Samano, DO   40 mg at 04/30/24 0852    pregabalin (LYRICA) capsule 150 mg  150 mg Oral BID Coleman Samano, DO   150 mg at 04/30/24 0852    rosuvastatin (CRESTOR) tablet 20 mg  20 mg Oral At Bedtime SelwynColeman lozano, DO   20 mg at 04/29/24 2012    sodium chloride (PF) 0.9% PF flush 3 mL  3 mL Intracatheter Q8H Kim Mansfield PA-C   3 mL at 04/30/24 1239    torsemide (DEMADEX) tablet 60 mg  60 mg Oral Daily SelwynColeman lozano, DO   60 mg at 04/30/24 0852                  Physical Exam:   Blood pressure 115/70, pulse 105, temperature 98.2  F (36.8  C), temperature source Oral, resp. rate 20, height 1.524 m (5'), weight 78.4 kg (172 lb 13.5 oz), SpO2 94%.  Wt Readings from Last 2 Encounters:   04/30/24 78.4 kg (172 lb 13.5 oz)   11/13/21 72.6 kg (160 lb)     Vital Signs with Ranges  Temp:  [97.5  F (36.4  C)-98.2  F (36.8  C)] 98.2  F (36.8  C)  Pulse:  [] 105  Resp:  [16-20] 20  BP: ()/(62-93) 115/70  SpO2:  [93 %-98 %] 94 %    Constitutional: Awake, alert, cooperative, no apparent distress looks mildly ill     Lungs: Clear to auscultation bilaterally, no crackles or wheezing   Cardiovascular: Regular rate and rhythm, normal S1 and S2, and no murmur noted   Abdomen: Normal bowel sounds, soft, non-distended, non-tender   Skin: No rashes, no cyanosis, no edema   Other:           Data:   All microbiology laboratory data reviewed.  Recent Labs   Lab Test 04/29/24  0833 04/28/24  0708 04/27/24  0732   WBC 4.0 3.6* 3.5*   HGB 11.6* 9.8* 12.2   HCT 34.0* 28.8* 35.4   * 101* 100   PLT 61* 60* 84*     Recent Labs   Lab Test 04/29/24  0833 04/28/24  0708 04/27/24  0732   CR 0.80 0.92 1.16*     No lab results found.  No lab results found.    Invalid input(s): \"UC\"     "

## 2024-04-30 NOTE — PLAN OF CARE
"PRIMARY DIAGNOSIS: ACUTE PAIN  OUTPATIENT/OBSERVATION GOALS TO BE MET BEFORE DISCHARGE:  1. Pain Status: Pain free.    2. Return to near baseline physical activity: No    3. Cleared for discharge by consultants (if involved): Yes    Discharge Planner Nurse   Safe discharge environment identified: Yes  Barriers to discharge: Yes       Patient is Alert and Oriented x4. They are 1 Assist with Gait Belt and Walker. Pt is on droplet precaution for RSV. Pt is a Mod carb diet.  They are denying pain.  Patient is Saline locked. ID/PT/OT- TCU referrals sent. Oxygen - 1L NC      Please review provider order for any additional goals.   Nurse to notify provider when observation goals have been met and patient is ready for discharge.  Problem: Adult Inpatient Plan of Care  Goal: Plan of Care Review  Description: The Plan of Care Review/Shift note should be completed every shift.  The Outcome Evaluation is a brief statement about your assessment that the patient is improving, declining, or no change.  This information will be displayed automatically on your shift  note.  4/30/2024 0520 by Niru Zamudio RN  Outcome: Progressing  Flowsheets (Taken 4/30/2024 0520)  Outcome Evaluation: TCU referrals sent  Plan of Care Reviewed With: patient  Overall Patient Progress: improving  4/30/2024 0126 by Niru Zamudio RN  Outcome: Progressing  Flowsheets (Taken 4/30/2024 0126)  Outcome Evaluation: TCU referrals sent  Plan of Care Reviewed With: patient  Overall Patient Progress: improving  Goal: Patient-Specific Goal (Individualized)  Description: You can add care plan individualizations to a care plan. Examples of Individualization might be:  \"Parent requests to be called daily at 9am for status\", \"I have a hard time hearing out of my right ear\", or \"Do not touch me to wake me up as it startles  me\".  4/30/2024 0520 by Niru Zamudio RN  Outcome: Progressing  4/30/2024 0126 by Niru Zamudio RN  Outcome: Progressing  Goal: " Absence of Hospital-Acquired Illness or Injury  4/30/2024 0520 by Niru Zamudio RN  Outcome: Progressing  4/30/2024 0126 by Niru Zamudio RN  Outcome: Progressing  Intervention: Identify and Manage Fall Risk  Recent Flowsheet Documentation  Taken 4/30/2024 0000 by Niru Zamudio RN  Safety Promotion/Fall Prevention: activity supervised  Intervention: Prevent and Manage VTE (Venous Thromboembolism) Risk  Recent Flowsheet Documentation  Taken 4/30/2024 0000 by Niru Zamudio RN  VTE Prevention/Management: SCDs (sequential compression devices) off  Intervention: Prevent Infection  Recent Flowsheet Documentation  Taken 4/30/2024 0000 by Niru Zamudio RN  Infection Prevention:   rest/sleep promoted   single patient room provided   hand hygiene promoted  Goal: Optimal Comfort and Wellbeing  4/30/2024 0520 by Niru Zamudio RN  Outcome: Progressing  4/30/2024 0126 by Niru Zamudio RN  Outcome: Progressing  Goal: Readiness for Transition of Care  4/30/2024 0520 by Niru Zamudio RN  Outcome: Progressing  4/30/2024 0126 by Niru Zamudio RN  Outcome: Progressing     Problem: Fall Injury Risk  Goal: Absence of Fall and Fall-Related Injury  4/30/2024 0520 by Niru Zamudio RN  Outcome: Progressing  4/30/2024 0126 by Niru Zamudio RN  Outcome: Progressing  Intervention: Identify and Manage Contributors  Recent Flowsheet Documentation  Taken 4/30/2024 0000 by Niru Zamudio RN  Medication Review/Management: medications reviewed  Intervention: Promote Injury-Free Environment  Recent Flowsheet Documentation  Taken 4/30/2024 0000 by Niru Zamudio RN  Safety Promotion/Fall Prevention: activity supervised     Problem: Gas Exchange Impaired  Goal: Optimal Gas Exchange  4/30/2024 0520 by Niru Zamudio RN  Outcome: Progressing  4/30/2024 0126 by Niru Zamudio RN  Outcome: Progressing  Intervention: Optimize Oxygenation and Ventilation  Recent Flowsheet Documentation  Taken 4/30/2024  0000 by Niru Zamudio RN  Head of Bed (HOB) Positioning: HOB at 20-30 degrees     Problem: Fatigue  Goal: Improved Activity Tolerance  4/30/2024 0520 by Niru Zamudio RN  Outcome: Progressing  4/30/2024 0126 by Niru Zamudio RN  Outcome: Progressing     Problem: Comorbidity Management  Goal: Maintenance of Heart Failure Symptom Control  4/30/2024 0520 by Niru Zamudio RN  Outcome: Progressing  4/30/2024 0126 by Niru Zamudio RN  Outcome: Progressing  Intervention: Maintain Heart Failure Management  Recent Flowsheet Documentation  Taken 4/30/2024 0000 by Niru Zamudio RN  Medication Review/Management: medications reviewed   Goal Outcome Evaluation:      Plan of Care Reviewed With: patient    Overall Patient Progress: improvingOverall Patient Progress: improving    Outcome Evaluation: TCU referrals sent

## 2024-05-01 ENCOUNTER — LAB REQUISITION (OUTPATIENT)
Dept: LAB | Facility: CLINIC | Age: 74
End: 2024-05-01

## 2024-05-01 ENCOUNTER — APPOINTMENT (OUTPATIENT)
Dept: PHYSICAL THERAPY | Facility: CLINIC | Age: 74
End: 2024-05-01
Payer: COMMERCIAL

## 2024-05-01 VITALS
DIASTOLIC BLOOD PRESSURE: 67 MMHG | BODY MASS INDEX: 33.38 KG/M2 | OXYGEN SATURATION: 94 % | RESPIRATION RATE: 18 BRPM | WEIGHT: 170 LBS | SYSTOLIC BLOOD PRESSURE: 104 MMHG | HEIGHT: 60 IN | TEMPERATURE: 98.9 F | HEART RATE: 101 BPM

## 2024-05-01 DIAGNOSIS — Z11.1 ENCOUNTER FOR SCREENING FOR RESPIRATORY TUBERCULOSIS: ICD-10-CM

## 2024-05-01 LAB
GLUCOSE BLDC GLUCOMTR-MCNC: 131 MG/DL (ref 70–99)
GLUCOSE BLDC GLUCOMTR-MCNC: 146 MG/DL (ref 70–99)
GLUCOSE BLDC GLUCOMTR-MCNC: 202 MG/DL (ref 70–99)

## 2024-05-01 PROCEDURE — 97530 THERAPEUTIC ACTIVITIES: CPT | Mod: GP | Performed by: PHYSICAL THERAPIST

## 2024-05-01 PROCEDURE — 82962 GLUCOSE BLOOD TEST: CPT

## 2024-05-01 PROCEDURE — G0378 HOSPITAL OBSERVATION PER HR: HCPCS

## 2024-05-01 PROCEDURE — 250N000013 HC RX MED GY IP 250 OP 250 PS 637: Performed by: PHYSICIAN ASSISTANT

## 2024-05-01 PROCEDURE — 97116 GAIT TRAINING THERAPY: CPT | Mod: GP | Performed by: PHYSICAL THERAPIST

## 2024-05-01 PROCEDURE — 250N000013 HC RX MED GY IP 250 OP 250 PS 637: Performed by: INTERNAL MEDICINE

## 2024-05-01 PROCEDURE — 99238 HOSP IP/OBS DSCHRG MGMT 30/<: CPT | Performed by: PHYSICIAN ASSISTANT

## 2024-05-01 RX ORDER — PREGABALIN 200 MG/1
200 CAPSULE ORAL 2 TIMES DAILY
Qty: 14 CAPSULE | Refills: 0 | Status: SHIPPED | OUTPATIENT
Start: 2024-05-01

## 2024-05-01 RX ORDER — BENZONATATE 100 MG/1
100 CAPSULE ORAL 3 TIMES DAILY PRN
DISCHARGE
Start: 2024-05-01

## 2024-05-01 RX ORDER — ACETAMINOPHEN 325 MG/1
650 TABLET ORAL EVERY 4 HOURS PRN
DISCHARGE
Start: 2024-05-01

## 2024-05-01 RX ORDER — LORAZEPAM 0.5 MG/1
.5-1 TABLET ORAL
Qty: 7 TABLET | Refills: 0 | Status: SHIPPED | OUTPATIENT
Start: 2024-05-01

## 2024-05-01 RX ORDER — GUAIFENESIN 600 MG/1
600 TABLET, EXTENDED RELEASE ORAL 2 TIMES DAILY
Status: DISCONTINUED | OUTPATIENT
Start: 2024-05-01 | End: 2024-05-01 | Stop reason: HOSPADM

## 2024-05-01 RX ORDER — GUAIFENESIN 600 MG/1
600 TABLET, EXTENDED RELEASE ORAL 2 TIMES DAILY
DISCHARGE
Start: 2024-05-01

## 2024-05-01 RX ADMIN — ACYCLOVIR 400 MG: 400 TABLET ORAL at 09:34

## 2024-05-01 RX ADMIN — FINASTERIDE 2.5 MG: 5 TABLET, FILM COATED ORAL at 09:34

## 2024-05-01 RX ADMIN — PANTOPRAZOLE SODIUM 40 MG: 40 TABLET, DELAYED RELEASE ORAL at 09:34

## 2024-05-01 RX ADMIN — CETIRIZINE HYDROCHLORIDE 10 MG: 10 TABLET, FILM COATED ORAL at 09:34

## 2024-05-01 RX ADMIN — DULOXETINE HYDROCHLORIDE 30 MG: 30 CAPSULE, DELAYED RELEASE ORAL at 09:34

## 2024-05-01 RX ADMIN — PREGABALIN 150 MG: 75 CAPSULE ORAL at 09:34

## 2024-05-01 RX ADMIN — GUAIFENESIN 600 MG: 600 TABLET, EXTENDED RELEASE ORAL at 11:49

## 2024-05-01 RX ADMIN — LETROZOLE 2.5 MG: 2.5 TABLET ORAL at 09:34

## 2024-05-01 RX ADMIN — TORSEMIDE 60 MG: 20 TABLET ORAL at 09:34

## 2024-05-01 RX ADMIN — Medication 1 LOZENGE: at 11:52

## 2024-05-01 RX ADMIN — SPIRONOLACTONE 12.5 MG: 25 TABLET ORAL at 09:34

## 2024-05-01 ASSESSMENT — ACTIVITIES OF DAILY LIVING (ADL)
ADLS_ACUITY_SCORE: 46
ADLS_ACUITY_SCORE: 43
ADLS_ACUITY_SCORE: 46
ADLS_ACUITY_SCORE: 43
ADLS_ACUITY_SCORE: 46
ADLS_ACUITY_SCORE: 43
ADLS_ACUITY_SCORE: 43
ADLS_ACUITY_SCORE: 46
ADLS_ACUITY_SCORE: 43
ADLS_ACUITY_SCORE: 46
ADLS_ACUITY_SCORE: 43

## 2024-05-01 NOTE — PLAN OF CARE
PRIMARY DIAGNOSIS: RSV/ Laringitis  OUTPATIENT/OBSERVATION GOALS TO BE MET BEFORE DISCHARGE:  Dyspnea improved and O2 sats >88% on RA or back to baseline O2 levels: No   SpO2: 95 %, O2 Device: Nasal cannula    Tolerating oral abx or appropriate plans made outpatient infusion:  NA    Vitals signs normal or return to baseline: No    Short term supplemental O2 needed with activity at home:  NA    Tolerate oral intake to maintain hydration: Yes    Return to near baseline physical activity: No    Discharge Planner Nurse   Safe discharge environment identified: Yes  Barriers to discharge: Yes       Entered by: Brisa Greenberg RN 05/01/2024 4:51 AM   Alert and oriented. Remained on oygen at 1LPM/NC Sats 88-89% on RA. Asleep in between cares. Plan TCU placement per PT recommendation, SW following.  Please review provider order for any additional goals.   Nurse to notify provider when observation goals have been met and patient is ready for discharge.  Problem: Gas Exchange Impaired  Goal: Optimal Gas Exchange  Outcome: Not Progressing  Intervention: Optimize Oxygenation and Ventilation  Recent Flowsheet Documentation  Taken 5/1/2024 0024 by Brisa Greenberg RN  Head of Bed (HOB) Positioning: HOB at 20-30 degrees      Problem: Adult Inpatient Plan of Care  Goal: Absence of Hospital-Acquired Illness or Injury  Intervention: Identify and Manage Fall Risk  Recent Flowsheet Documentation  Taken 5/1/2024 0024 by Brisa Greenberg RN  Safety Promotion/Fall Prevention: activity supervised  Intervention: Prevent Skin Injury  Recent Flowsheet Documentation  Taken 5/1/2024 0024 by Brisa Greenberg RN  Body Position: position changed independently  Intervention: Prevent and Manage VTE (Venous Thromboembolism) Risk  Recent Flowsheet Documentation  Taken 5/1/2024 0024 by Brisa Greenberg RN  VTE Prevention/Management: SCDs (sequential compression devices) off  Intervention: Prevent Infection  Recent Flowsheet  Documentation  Taken 5/1/2024 0024 by Brisa Greenberg RN  Infection Prevention:   single patient room provided   rest/sleep promoted     Problem: Fall Injury Risk  Goal: Absence of Fall and Fall-Related Injury  Intervention: Identify and Manage Contributors  Recent Flowsheet Documentation  Taken 5/1/2024 0024 by Brisa Greenberg RN  Medication Review/Management: medications reviewed  Intervention: Promote Injury-Free Environment  Recent Flowsheet Documentation  Taken 5/1/2024 0024 by Brisa Greenberg RN  Safety Promotion/Fall Prevention: activity supervised     Problem: Gas Exchange Impaired  Goal: Optimal Gas Exchange  Intervention: Optimize Oxygenation and Ventilation  Recent Flowsheet Documentation  Taken 5/1/2024 0024 by Brisa Greenberg RN  Head of Bed (HOB) Positioning: HOB at 20-30 degrees     Problem: Comorbidity Management  Goal: Maintenance of Heart Failure Symptom Control  Intervention: Maintain Heart Failure Management  Recent Flowsheet Documentation  Taken 5/1/2024 0024 by Brisa Greenberg RN  Medication Review/Management: medications reviewed

## 2024-05-01 NOTE — PLAN OF CARE
pT a&oX4, PLEASANT.  RA, Combo/ cardiac diet. A1/ SBA with gait belt and walker. Droplet and contact precautions in place. Denies pain.   Plan to go to TCU with acceptance.   Problem: Adult Inpatient Plan of Care  Goal: Absence of Hospital-Acquired Illness or Injury  Intervention: Identify and Manage Fall Risk  Recent Flowsheet Documentation  Taken 5/1/2024 1200 by Ebony Madsen RN  Safety Promotion/Fall Prevention:   activity supervised   assistive device/personal items within reach   clutter free environment maintained   mobility aid in reach   nonskid shoes/slippers when out of bed   patient and family education   room near nurse's station   room organization consistent   safety round/check completed   supervised activity  Taken 5/1/2024 0800 by Ebony Madsen RN  Safety Promotion/Fall Prevention:   activity supervised   assistive device/personal items within reach   clutter free environment maintained   mobility aid in reach   nonskid shoes/slippers when out of bed   patient and family education   room near nurse's station   room organization consistent   safety round/check completed   supervised activity  Intervention: Prevent Skin Injury  Recent Flowsheet Documentation  Taken 5/1/2024 1200 by Ebony Madsen RN  Body Position: position changed independently  Skin Protection: adhesive use limited  Device Skin Pressure Protection: adhesive use limited  Taken 5/1/2024 0800 by Ebony Madsen RN  Body Position: position changed independently  Skin Protection: adhesive use limited  Device Skin Pressure Protection: adhesive use limited  Intervention: Prevent and Manage VTE (Venous Thromboembolism) Risk  Recent Flowsheet Documentation  Taken 5/1/2024 1200 by Ebony Madsen RN  VTE Prevention/Management: SCDs (sequential compression devices) off  Taken 5/1/2024 0800 by Ebony Madsen RN  VTE Prevention/Management: SCDs (sequential compression devices) off  Intervention: Prevent Infection  Recent  Flowsheet Documentation  Taken 5/1/2024 1200 by Ebony Madsen RN  Infection Prevention:   environmental surveillance performed   rest/sleep promoted   personal protective equipment utilized   single patient room provided  Taken 5/1/2024 0800 by Ebony Madsen RN  Infection Prevention:   environmental surveillance performed   rest/sleep promoted   personal protective equipment utilized   single patient room provided  Goal: Optimal Comfort and Wellbeing  Intervention: Monitor Pain and Promote Comfort  Recent Flowsheet Documentation  Taken 5/1/2024 0800 by Ebony Madsen RN  Pain Management Interventions:   rest   repositioned     Problem: Adult Inpatient Plan of Care  Goal: Absence of Hospital-Acquired Illness or Injury  Intervention: Prevent Skin Injury  Recent Flowsheet Documentation  Taken 5/1/2024 1200 by Ebony Madsen RN  Body Position: position changed independently  Skin Protection: adhesive use limited  Device Skin Pressure Protection: adhesive use limited  Taken 5/1/2024 0800 by Ebony Madsen RN  Body Position: position changed independently  Skin Protection: adhesive use limited  Device Skin Pressure Protection: adhesive use limited     Problem: Adult Inpatient Plan of Care  Goal: Absence of Hospital-Acquired Illness or Injury  Intervention: Prevent Skin Injury  Recent Flowsheet Documentation  Taken 5/1/2024 1200 by Ebony Madsen RN  Body Position: position changed independently  Skin Protection: adhesive use limited  Device Skin Pressure Protection: adhesive use limited  Taken 5/1/2024 0800 by Ebony Madsen RN  Body Position: position changed independently  Skin Protection: adhesive use limited  Device Skin Pressure Protection: adhesive use limited     Problem: Fall Injury Risk  Goal: Absence of Fall and Fall-Related Injury  Intervention: Identify and Manage Contributors  Recent Flowsheet Documentation  Taken 5/1/2024 1200 by Ebony Madsen RN  Medication  Review/Management: medications reviewed  Taken 5/1/2024 0800 by Ebony Madsen RN  Medication Review/Management: medications reviewed  Intervention: Promote Injury-Free Environment  Recent Flowsheet Documentation  Taken 5/1/2024 1200 by Ebony Madsen RN  Safety Promotion/Fall Prevention:   activity supervised   assistive device/personal items within reach   clutter free environment maintained   mobility aid in reach   nonskid shoes/slippers when out of bed   patient and family education   room near nurse's station   room organization consistent   safety round/check completed   supervised activity  Taken 5/1/2024 0800 by Ebony Madsen RN  Safety Promotion/Fall Prevention:   activity supervised   assistive device/personal items within reach   clutter free environment maintained   mobility aid in reach   nonskid shoes/slippers when out of bed   patient and family education   room near nurse's station   room organization consistent   safety round/check completed   supervised activity     Problem: Gas Exchange Impaired  Goal: Optimal Gas Exchange  Intervention: Optimize Oxygenation and Ventilation  Recent Flowsheet Documentation  Taken 5/1/2024 1200 by Ebony Madsen RN  Airway/Ventilation Management: airway patency maintained  Head of Bed (HOB) Positioning:   HOB at 30-45 degrees   HOB at 60-90 degrees  Taken 5/1/2024 0800 by Ebony Madsen RN  Airway/Ventilation Management: airway patency maintained  Head of Bed (HOB) Positioning:   HOB at 30-45 degrees   HOB at 60-90 degrees     Problem: Gas Exchange Impaired  Goal: Optimal Gas Exchange  5/1/2024 1402 by Ebony Madsen RN  Outcome: Progressing  5/1/2024 1401 by Ebony Madsen RN  Outcome: Progressing  Intervention: Optimize Oxygenation and Ventilation  Recent Flowsheet Documentation  Taken 5/1/2024 1200 by Ebony Madsen RN  Airway/Ventilation Management: airway patency maintained  Head of Bed (HOB) Positioning:   HOB at 30-45  degrees   HOB at 60-90 degrees  Taken 5/1/2024 0800 by Ebony Madsen RN  Airway/Ventilation Management: airway patency maintained  Head of Bed (HOB) Positioning:   HOB at 30-45 degrees   HOB at 60-90 degrees     Problem: Fatigue  Goal: Improved Activity Tolerance  Intervention: Promote Improved Energy  Recent Flowsheet Documentation  Taken 5/1/2024 1200 by Ebony Madsen RN  Activity Management: activity adjusted per tolerance  Taken 5/1/2024 0800 by Ebony Madsen RN  Activity Management: activity adjusted per tolerance     Problem: Comorbidity Management  Goal: Maintenance of Heart Failure Symptom Control  Intervention: Maintain Heart Failure Management  Recent Flowsheet Documentation  Taken 5/1/2024 1200 by Ebony Madsen RN  Medication Review/Management: medications reviewed  Taken 5/1/2024 0800 by Ebony Madsen RN  Medication Review/Management: medications reviewed   Goal Outcome Evaluation:

## 2024-05-01 NOTE — PLAN OF CARE
Patient's After Visit Summary was reviewed with patient and/or friend, sent to [facility] with pt.   Patient verbalized understanding of After Visit Summary, recommended follow up and was given an opportunity to ask questions.   Discharge medications sent home with patient/family: Not applicable sent to TCU  Discharged with other: friend, Meenakshi, who will transport pt to [facility].    All belongings and IFTP packet sent to TCU with pt and friend.  Pt well and VSS upon departure from room 222.  /67 (BP Location: Right arm)   Pulse 101   Temp 98.9  F (37.2  C) (Oral)   Resp 18   Ht 1.524 m (5')   Wt 77.1 kg (170 lb)   SpO2 94%   BMI 33.20 kg/m      OBSERVATION patient END time: 1530        Problem: Adult Inpatient Plan of Care  Goal: Absence of Hospital-Acquired Illness or Injury  Intervention: Identify and Manage Fall Risk  Recent Flowsheet Documentation  Taken 5/1/2024 1200 by Ebony Madsen, RN  Safety Promotion/Fall Prevention:   activity supervised   assistive device/personal items within reach   clutter free environment maintained   mobility aid in reach   nonskid shoes/slippers when out of bed   patient and family education   room near nurse's station   room organization consistent   safety round/check completed   supervised activity  Taken 5/1/2024 0800 by Ebony Madsen, RN  Safety Promotion/Fall Prevention:   activity supervised   assistive device/personal items within reach   clutter free environment maintained   mobility aid in reach   nonskid shoes/slippers when out of bed   patient and family education   room near nurse's station   room organization consistent   safety round/check completed   supervised activity  Intervention: Prevent Skin Injury  Recent Flowsheet Documentation  Taken 5/1/2024 1200 by Ebony Madsen RN  Body Position: position changed independently  Skin Protection: adhesive use limited  Device Skin Pressure Protection: adhesive use limited  Taken 5/1/2024  0800 by Ebony Madsen RN  Body Position: position changed independently  Skin Protection: adhesive use limited  Device Skin Pressure Protection: adhesive use limited  Intervention: Prevent and Manage VTE (Venous Thromboembolism) Risk  Recent Flowsheet Documentation  Taken 5/1/2024 1200 by Ebony Madsen RN  VTE Prevention/Management: SCDs (sequential compression devices) off  Taken 5/1/2024 0800 by Ebony Madsen RN  VTE Prevention/Management: SCDs (sequential compression devices) off  Intervention: Prevent Infection  Recent Flowsheet Documentation  Taken 5/1/2024 1200 by Ebony Madsen RN  Infection Prevention:   environmental surveillance performed   rest/sleep promoted   personal protective equipment utilized   single patient room provided  Taken 5/1/2024 0800 by Ebony Madsen RN  Infection Prevention:   environmental surveillance performed   rest/sleep promoted   personal protective equipment utilized   single patient room provided  Goal: Optimal Comfort and Wellbeing  Intervention: Monitor Pain and Promote Comfort  Recent Flowsheet Documentation  Taken 5/1/2024 0800 by Ebony Madsen RN  Pain Management Interventions:   rest   repositioned     Problem: Fall Injury Risk  Goal: Absence of Fall and Fall-Related Injury  Intervention: Identify and Manage Contributors  Recent Flowsheet Documentation  Taken 5/1/2024 1200 by Ebony Madsen RN  Medication Review/Management: medications reviewed  Taken 5/1/2024 0800 by Ebony Madsen RN  Medication Review/Management: medications reviewed  Intervention: Promote Injury-Free Environment  Recent Flowsheet Documentation  Taken 5/1/2024 1200 by Ebony Madsen RN  Safety Promotion/Fall Prevention:   activity supervised   assistive device/personal items within reach   clutter free environment maintained   mobility aid in reach   nonskid shoes/slippers when out of bed   patient and family education   room near nurse's station   room  organization consistent   safety round/check completed   supervised activity  Taken 5/1/2024 0800 by Ebony Madsen RN  Safety Promotion/Fall Prevention:   activity supervised   assistive device/personal items within reach   clutter free environment maintained   mobility aid in reach   nonskid shoes/slippers when out of bed   patient and family education   room near nurse's station   room organization consistent   safety round/check completed   supervised activity     Problem: Gas Exchange Impaired  Goal: Optimal Gas Exchange  Intervention: Optimize Oxygenation and Ventilation  Recent Flowsheet Documentation  Taken 5/1/2024 1200 by Ebony Madsen RN  Airway/Ventilation Management: airway patency maintained  Head of Bed (HOB) Positioning:   HOB at 30-45 degrees   HOB at 60-90 degrees  Taken 5/1/2024 0800 by Ebony Madsen RN  Airway/Ventilation Management: airway patency maintained  Head of Bed (HOB) Positioning:   HOB at 30-45 degrees   HOB at 60-90 degrees     Problem: Gas Exchange Impaired  Goal: Optimal Gas Exchange  5/1/2024 1405 by Ebony Madsen RN  Outcome: Progressing  5/1/2024 1402 by Ebony Madsen RN  Outcome: Progressing  5/1/2024 1401 by Ebony Madsen RN  Outcome: Progressing  Intervention: Optimize Oxygenation and Ventilation  Recent Flowsheet Documentation  Taken 5/1/2024 1200 by Ebony Madsen RN  Airway/Ventilation Management: airway patency maintained  Head of Bed (HOB) Positioning:   HOB at 30-45 degrees   HOB at 60-90 degrees  Taken 5/1/2024 0800 by Ebony Madsen RN  Airway/Ventilation Management: airway patency maintained  Head of Bed (HOB) Positioning:   HOB at 30-45 degrees   HOB at 60-90 degrees     Problem: Fatigue  Goal: Improved Activity Tolerance  Intervention: Promote Improved Energy  Recent Flowsheet Documentation  Taken 5/1/2024 1200 by Ebony Madsen RN  Activity Management: activity adjusted per tolerance  Taken 5/1/2024 0800 by Ebony Madsen  JADA RN  Activity Management: activity adjusted per tolerance     Problem: Comorbidity Management  Goal: Maintenance of Heart Failure Symptom Control  Intervention: Maintain Heart Failure Management  Recent Flowsheet Documentation  Taken 5/1/2024 1200 by Ebony Madsen, RN  Medication Review/Management: medications reviewed  Taken 5/1/2024 0800 by Ebony Madsen, RN  Medication Review/Management: medications reviewed   Goal Outcome Evaluation:

## 2024-05-01 NOTE — PLAN OF CARE
PRIMARY DIAGNOSIS: RSV/Laringitis  OUTPATIENT/OBSERVATION GOALS TO BE MET BEFORE DISCHARGE:  Dyspnea improved and O2 sats >88% on RA or back to baseline O2 levels: No   SpO2: 95 %, O2 Device: Nasal cannula    Tolerating oral abx or appropriate plans made outpatient infusion:  NA    Vitals signs normal or return to baseline: No    Short term supplemental O2 needed with activity at home:  NA    Tolerate oral intake to maintain hydration: Yes    Return to near baseline physical activity: No    Discharge Planner Nurse   Safe discharge environment identified: Yes  Barriers to discharge: Yes       Entered by: Brisa Greenberg RN 05/01/2024 4:48 AM     Please review provider order for any additional goals.   Nurse to notify provider when observation goals have been met and patient is ready for discharge.  Problem: Adult Inpatient Plan of Care  Goal: Absence of Hospital-Acquired Illness or Injury  Intervention: Identify and Manage Fall Risk  Recent Flowsheet Documentation  Taken 5/1/2024 0024 by Brisa Greenberg RN  Safety Promotion/Fall Prevention: activity supervised  Intervention: Prevent Skin Injury  Recent Flowsheet Documentation  Taken 5/1/2024 0024 by Brisa Greenberg RN  Body Position: position changed independently  Intervention: Prevent and Manage VTE (Venous Thromboembolism) Risk  Recent Flowsheet Documentation  Taken 5/1/2024 0024 by Brisa Greenberg RN  VTE Prevention/Management: SCDs (sequential compression devices) off  Intervention: Prevent Infection  Recent Flowsheet Documentation  Taken 5/1/2024 0024 by Brisa Greenberg RN  Infection Prevention:   single patient room provided   rest/sleep promoted     Problem: Fall Injury Risk  Goal: Absence of Fall and Fall-Related Injury  Intervention: Identify and Manage Contributors  Recent Flowsheet Documentation  Taken 5/1/2024 0024 by Brisa Greenberg RN  Medication Review/Management: medications reviewed  Intervention: Promote Injury-Free  Environment  Recent Flowsheet Documentation  Taken 5/1/2024 0024 by Brisa Greenberg, RN  Safety Promotion/Fall Prevention: activity supervised     Problem: Gas Exchange Impaired  Goal: Optimal Gas Exchange  Intervention: Optimize Oxygenation and Ventilation  Recent Flowsheet Documentation  Taken 5/1/2024 0024 by Brisa Greenberg, RN  Head of Bed (HOB) Positioning: HOB at 20-30 degrees     Problem: Comorbidity Management  Goal: Maintenance of Heart Failure Symptom Control  Intervention: Maintain Heart Failure Management  Recent Flowsheet Documentation  Taken 5/1/2024 0024 by Brisa Greenberg, RN  Medication Review/Management: medications reviewed

## 2024-05-01 NOTE — PLAN OF CARE
Goal Outcome Evaluation:       PRIMARY DIAGNOSIS: RSV  OUTPATIENT/OBSERVATION GOALS TO BE MET BEFORE DISCHARGE:  1. Vital signs stable: Yes           3. Dyspnea improved and O2 sats >88% at RA or at prior home O2 therapy level: NO      SpO2: 90 %, O2 Device:1 LPM     4. Short term supplemental O2 needed for use with activity at home:yes  5. Tolerating adequate PO diet and medications: Yes     6. Return to near baseline physical activity: No        Discharge Planner Nurse  Safe discharge environment identified: Yes  Barriers to discharge: Yes.  /63 (BP Location: Right arm)   Pulse 102   Temp 97.8  F (36.6  C) (Oral)   Resp 18   Ht 1.524 m (5')   Wt 78.4 kg (172 lb 13.5 oz)   SpO2 91%   BMI 33.76 kg/m       Patient alert and oriented x4.VSS RA,Moderated carb tolerated well,denies pain.  Problem: Adult Inpatient Plan of Care  Goal: Absence of Hospital-Acquired Illness or Injury  Intervention: Identify and Manage Fall Risk  Recent Flowsheet Documentation  Taken 4/29/2024 1536 by Carl Griffin RN  Safety Promotion/Fall Prevention: activity supervised  Intervention: Prevent Skin Injury  Recent Flowsheet Documentation  Taken 4/29/2024 1536 by Carl Griffin RN  Body Position: position changed independently  Intervention: Prevent and Manage VTE (Venous Thromboembolism) Risk  Recent Flowsheet Documentation  Taken 4/29/2024 1536 by Carl Grfifin RN  VTE Prevention/Management: SCDs (sequential compression devices) off  Intervention: Prevent Infection  Recent Flowsheet Documentation  Taken 4/29/2024 1536 by Carl Griffin RN  Infection Prevention:   rest/sleep promoted   single patient room provided   hand hygiene promoted

## 2024-05-01 NOTE — DISCHARGE SUMMARY
[facility]  Hospitalist Discharge Summary      Date of Admission:  4/27/2024  Date of Discharge:  5/1/2024  Discharging Provider: Julianna Zamarripa PA-C  Discharge Service: Hospitalist Service    Discharge Diagnoses   RSV     Clinically Significant Risk Factors     # Obesity: Estimated body mass index is 33.2 kg/m  as calculated from the following:    Height as of this encounter: 1.524 m (5').    Weight as of this encounter: 77.1 kg (170 lb).       Follow-ups Needed After Discharge   Follow-up Appointments     Follow Up and recommended labs and tests      Follow up with [facility] physician.  The following labs/tests are   recommended: BMP, CBC.            Unresulted Labs Ordered in the Past 30 Days of this Admission       Date and Time Order Name Status Description    4/27/2024  4:50 PM Blood Culture Arm, Right Preliminary     4/27/2024  4:50 PM Blood Culture Hand, Right Preliminary             Discharge Disposition   Discharged to home  Condition at discharge: Stable    Hospital Course   Cathleen Ty is a 73 year old female with a history of HFrEF with most recent EF 33%, breast cancer with metastasis to bone and follows with Dr. Stiles of oncology through [facility] currently on Ibrance, type 2 diabetes mellitus with insulin dependence, BEN, fibromyalgia, hyperlipidemia, chronic kidney disease, GERD, and SSS s/p biventricular pacemaker, who was admitted on 4/27/2024 with generalized weakness and RSV infection.     The patient states that she has had cough, congestion and fatigue for about a week. Then last night she tried to roll out of bed to get her phone but she rolled onto the floor and could not get herself up, so she stayed there all night. She was eventually able to contact EMS for assistance and was brought to the ER.      Work up in the ED reveals: VSS, afebrile. CMP revealed Na of 135. K 3.5, BUN 21, Cr 1.16, glc 133, LFTs within normal limits. CK 72. CBC revealed  wbc of 3.5 (was 5.2 on 4/18), hgb 12.2, plt 84 (was 178 on 4/18). CXR negative. RSV was positive. She was given Duonebs x 3 in the ER and will be registered to Observation for further evaluation and symptom management.     She was treated supportively with mucolytics and cough suppressants with improvement. PT consulted and recommended TCU and social work was able to find placement in [place].      During admission we continued Spironolatone but discontinued Lasix.         Consultations This Hospital Stay   INFECTIOUS DISEASES IP CONSULT  PHYSICAL THERAPY ADULT IP CONSULT  OCCUPATIONAL THERAPY ADULT IP CONSULT  CARE MANAGEMENT / SOCIAL WORK IP CONSULT  PHYSICAL THERAPY ADULT IP CONSULT  OCCUPATIONAL THERAPY ADULT IP CONSULT    Code Status   Special Code    Time Spent on this Encounter   I, Julianna Zamarripa PA-C, personally saw the patient today and spent less than or equal to 30 minutes discharging this patient.       Julianna Zamarripa PA-C  [facility] OBSERVATION DEPT  [address] [place] [address]  Phone: [phone]  ______________________________________________________________________    Physical Exam   Vital Signs: Temp: 98.6  F (37  C) Temp src: Oral BP: 100/56 Pulse: 89   Resp: 18 SpO2: 92 % O2 Device: None (Room air) Oxygen Delivery: 1 LPM  Weight: 170 lbs 0 oz  General Appearance: Alert and orientated x 3, loss of voice  Respiratory: CTAB  Cardiovascular: RRR without murmur  GI: Bowel sounds are present without tenderness  Skin: No rash or open sores are noted         Primary Care Physician   Javid Cruz    Discharge Orders      General info for SNF    Length of Stay Estimate: Short Term Care: Estimated # of Days <30  Condition at Discharge: Stable  Level of care:skilled   Rehabilitation Potential: Good  Admission H&P remains valid and up-to-date: Yes  Recent Chemotherapy: N/A  Use Nursing Home Standing Orders: Yes     [place] instructions    Give two-step  [place] (PPD) Per Facility Policy Yes     Follow Up and recommended labs and tests    Follow up with assisted physician.  The following labs/tests are recommended: BMP, CBC.     Activity - Up with nursing assistance     Reason for your hospital stay    You were admitted for concerns of respiratory complaints related to RSV infection. You states your symptoms started on 4/27. We started you on supportive care medicines which improved your symptoms but you were very weak and needed to go to TCU.    If symptoms started on 4/27 your last day of precautions is today.    We did not resume Lasix at discharge. It appears this was discontinued in the past and we did not use it in the hospital.     Special Code     Physical Therapy Adult Consult    Evaluate and treat as clinically indicated.    Reason:  Weakness     Occupational Therapy Adult Consult    Evaluate and treat as clinically indicated.    Reason:  Weakness     Droplet Isolation    End after 5/1     Pneumatic Compression Device     Bilateral calf. Remove 30 mins BID.     Diet    Follow this diet upon discharge: Orders Placed This Encounter      Combination Diet Moderate Consistent Carb (60 g CHO per Meal) Diet; 3 gm NA Diet       Significant Results and Procedures   Most Recent 3 CBC's:  Recent Labs   Lab Test 04/29/24  0833 04/28/24  0708 04/27/24  0732   WBC 4.0 3.6* 3.5*   HGB 11.6* 9.8* 12.2   * 101* 100   PLT 61* 60* 84*     Most Recent 3 BMP's:  Recent Labs   Lab Test 05/01/24  1133 05/01/24  0803 05/01/24  0206 04/29/24  0940 04/29/24  0833 04/28/24  0747 04/28/24  0708 04/27/24  1515 04/27/24  0732   NA  --   --   --   --  144  --  142  --  135   POTASSIUM  --   --   --   --  4.1  --  3.7  --  3.5   CHLORIDE  --   --   --   --  110*  --  105  --  96*   CO2  --   --   --   --  22  --  23  --  25   BUN  --   --   --   --  15.7  --  17.7  --  21.1   CR  --   --   --   --  0.80  --  0.92  --  1.16*   ANIONGAP  --   --   --   --  12  --  14  --  14    BUD  --   --   --   --  9.6  --  9.0  --  8.9   * 131* 146*   < > 119*   < > 122*   < > 133*    < > = values in this interval not displayed.   ,   Results for orders placed or performed during the hospital encounter of 04/27/24   Chest XR,  PA & LAT    Narrative    EXAM: XR CHEST 2 VIEWS  LOCATION: [facility]  DATE: 04/27/2024    INDICATION: Cough.  COMPARISON: None available.      Impression    IMPRESSION: Cardiomegaly. Apparent right lung haziness is favored artifactual/overlapping structures rather than lung parenchymal process. No convincing focal consolidation, pleural effusion or pneumothorax. Right chest wall cardiac device.         Discharge Medications   Current Discharge Medication List        START taking these medications    Details   acetaminophen (TYLENOL) 325 MG tablet Take 2 tablets (650 mg) by mouth every 4 hours as needed for mild pain or other (and adjunct with moderate or severe pain or per patient request)    Associated Diagnoses: Mild pain      benzocaine-menthol (CHLORASEPTIC) 6-10 MG lozenge Place 1 lozenge inside cheek every hour as needed for sore throat    Associated Diagnoses: RSV infection      benzonatate (TESSALON) 100 MG capsule Take 1 capsule (100 mg) by mouth 3 times daily as needed for cough    Associated Diagnoses: RSV infection      guaiFENesin (MUCINEX) 600 MG 12 hr tablet Take 1 tablet (600 mg) by mouth 2 times daily    Associated Diagnoses: RSV infection           CONTINUE these medications which have CHANGED    Details   LORazepam (ATIVAN) 0.5 MG tablet Take 1-2 tablets (0.5-1 mg) by mouth nightly as needed for anxiety  Qty: 7 tablet, Refills: 0    Associated Diagnoses: Anxiety      Pregabalin (LYRICA) 200 MG capsule Take 1 capsule (200 mg) by mouth 2 times daily  Qty: 14 capsule, Refills: 0    Associated Diagnoses: Other chronic pain           CONTINUE these medications which have NOT CHANGED    Details   acyclovir (ZOVIRAX) 400 MG tablet Take  400 mg by mouth daily      aspirin (ASA) 81 MG chewable tablet Take 81 mg by mouth daily      cetirizine (ZYRTEC) 10 MG tablet Take 10 mg by mouth daily      cyclobenzaprine (FLEXERIL) 10 MG tablet Take 10 mg by mouth daily as needed.      DULoxetine (CYMBALTA) 30 MG capsule Take 30 mg by mouth daily      finasteride (PROSCAR) 5 MG tablet Take 2.5 mg by mouth daily      letrozole (FEMARA) 2.5 MG tablet Take 2.5 mg by mouth daily      montelukast (SINGULAIR) 10 MG tablet Take 10 mg by mouth At Bedtime      OLANZapine (ZYPREXA) 5 MG tablet Take 5-10 mg by mouth at bedtime      pantoprazole (PROTONIX) 40 MG EC tablet Take 40 mg by mouth daily      rosuvastatin (CRESTOR) 20 MG tablet Take 20 mg by mouth at bedtime      spironolactone (ALDACTONE) 25 MG tablet Take 12.5 mg by mouth daily      torsemide (DEMADEX) 20 MG tablet Take 60 mg by mouth daily      co-enzyme Q-10 100 MG CAPS capsule Take by mouth daily           STOP taking these medications       furosemide (LASIX) 40 MG tablet Comments:   Reason for Stopping:             Allergies   Allergies   Allergen Reactions    Aspirin [Dihydroxyaluminum Aminoacetate] GI Disturbance    Dilaudid [Hydromorphone Hcl] Other (See Comments)     Emotional, insomnia    Lipitor [Atorvastatin Calcium]      Chest pain    Penicillins     Percocet [Oxycodone-Acetaminophen] Other (See Comments)     Emotional, insomnia    Sulfa Antibiotics Itching and Rash

## 2024-05-01 NOTE — PROGRESS NOTES
Care Management Follow Up    Length of Stay (days): 0    Expected Discharge Date: 05/01/2024     Concerns to be Addressed: discharge planning     Patient plan of care discussed at interdisciplinary rounds: Yes    Anticipated Discharge Disposition: Transitional Care     Anticipated Discharge Services: None  Anticipated Discharge DME: None    Patient/family educated on Medicare website which has current facility and service quality ratings: yes  Education Provided on the Discharge Plan: Yes  Patient/Family in Agreement with the Plan: yes    Referrals Placed by CM/SW: Post Acute Facilities    Additional Information:  CM following for discharge planning, no accepting TCU at this time. Additional referrals sent and LM with pending referrals as well.     Mariann Lopez RN BSN   Inpatient Care Coordination  [facility]   Phone [phone]

## 2024-05-01 NOTE — PROGRESS NOTES
Care Management Discharge Note    Discharge Date: 05/01/2024     Discharge Disposition: Transitional Care    Discharge Services: None    Discharge DME: None    Discharge Transportation: family or friend will provide    Private pay costs discussed: transportation costs    PAS Confirmation Code: [id number]  Patient/family educated on Medicare website which has current facility and service quality ratings: yes    Education Provided on the Discharge Plan: Yes  Persons Notified of Discharge Plans: pt  Patient/Family in Agreement with the Plan: yes    Additional Information:  Pt accepted to [facility] TCU, updated pt. Reviewed out of pocket cost for [facility] transport, $89.98 base and $5.79 per mile to the destination. Spoke with pt, they expressed understanding and are agreeable to this.  [employer]  ride arranged for today between 3570-1450. Orders completed and sent, scripts faxed and placed in hard chart. Called [facility] and updated on accepting facility, pt approved from 5/1-5/7. Updated bedside RN and TCU admissions.     Mariann Lopez RN BSN   Inpatient Care Coordination  [facility]   Phone [phone]

## 2024-05-01 NOTE — PLAN OF CARE
pT a&oX4, PLEASANT.  RA, Combo/ cardiac diet. A1/ SBA with gait belt and walker. Droplet and contact precautions in place. Continues to deny pain, SOB. Lozenge given for hoarse throat.   [facility] [address] accepted pt.  Will discharge from the hospital and have friend, Meenakshi, transport pt there around 1500.   Plan to go to TCU with acceptance.   Problem: Adult Inpatient Plan of Care  Goal: Absence of Hospital-Acquired Illness or Injury  Intervention: Identify and Manage Fall Risk  Recent Flowsheet Documentation  Taken 5/1/2024 1200 by Ebony Madsen RN  Safety Promotion/Fall Prevention:   activity supervised   assistive device/personal items within reach   clutter free environment maintained   mobility aid in reach   nonskid shoes/slippers when out of bed   patient and family education   room near nurse's station   room organization consistent   safety round/check completed   supervised activity  Taken 5/1/2024 0800 by Ebony Madsen RN  Safety Promotion/Fall Prevention:   activity supervised   assistive device/personal items within reach   clutter free environment maintained   mobility aid in reach   nonskid shoes/slippers when out of bed   patient and family education   room near nurse's station   room organization consistent   safety round/check completed   supervised activity  Intervention: Prevent Skin Injury  Recent Flowsheet Documentation  Taken 5/1/2024 1200 by Ebony Madsen RN  Body Position: position changed independently  Skin Protection: adhesive use limited  Device Skin Pressure Protection: adhesive use limited  Taken 5/1/2024 0800 by Ebony Madsen RN  Body Position: position changed independently  Skin Protection: adhesive use limited  Device Skin Pressure Protection: adhesive use limited  Intervention: Prevent and Manage VTE (Venous Thromboembolism) Risk  Recent Flowsheet Documentation  Taken 5/1/2024 1200 by Ebony Madsen RN  VTE Prevention/Management: SCDs (sequential compression  devices) off  Taken 5/1/2024 0800 by Ebony Madsen RN  VTE Prevention/Management: SCDs (sequential compression devices) off  Intervention: Prevent Infection  Recent Flowsheet Documentation  Taken 5/1/2024 1200 by Ebony Madsen RN  Infection Prevention:   environmental surveillance performed   rest/sleep promoted   personal protective equipment utilized   single patient room provided  Taken 5/1/2024 0800 by Ebony Madsen RN  Infection Prevention:   environmental surveillance performed   rest/sleep promoted   personal protective equipment utilized   single patient room provided  Goal: Optimal Comfort and Wellbeing  Intervention: Monitor Pain and Promote Comfort  Recent Flowsheet Documentation  Taken 5/1/2024 0800 by Ebony Madsen RN  Pain Management Interventions:   rest   repositioned     Problem: Fall Injury Risk  Goal: Absence of Fall and Fall-Related Injury  Intervention: Identify and Manage Contributors  Recent Flowsheet Documentation  Taken 5/1/2024 1200 by Ebony Madsen RN  Medication Review/Management: medications reviewed  Taken 5/1/2024 0800 by Ebony Madsen RN  Medication Review/Management: medications reviewed  Intervention: Promote Injury-Free Environment  Recent Flowsheet Documentation  Taken 5/1/2024 1200 by Ebony Madsen RN  Safety Promotion/Fall Prevention:   activity supervised   assistive device/personal items within reach   clutter free environment maintained   mobility aid in reach   nonskid shoes/slippers when out of bed   patient and family education   room near nurse's station   room organization consistent   safety round/check completed   supervised activity  Taken 5/1/2024 0800 by Ebony Madsen RN  Safety Promotion/Fall Prevention:   activity supervised   assistive device/personal items within reach   clutter free environment maintained   mobility aid in reach   nonskid shoes/slippers when out of bed   patient and family education   room near nurse's  station   room organization consistent   safety round/check completed   supervised activity     Problem: Gas Exchange Impaired  Goal: Optimal Gas Exchange  Intervention: Optimize Oxygenation and Ventilation  Recent Flowsheet Documentation  Taken 5/1/2024 1200 by Ebony Madsen RN  Airway/Ventilation Management: airway patency maintained  Head of Bed (HOB) Positioning:   HOB at 30-45 degrees   HOB at 60-90 degrees  Taken 5/1/2024 0800 by Ebony Madsen RN  Airway/Ventilation Management: airway patency maintained  Head of Bed (HOB) Positioning:   HOB at 30-45 degrees   HOB at 60-90 degrees     Problem: Gas Exchange Impaired  Goal: Optimal Gas Exchange  5/1/2024 1405 by Ebony Madsen RN  Outcome: Progressing  5/1/2024 1402 by Ebony Madsen RN  Outcome: Progressing  5/1/2024 1401 by Ebony Madsen RN  Outcome: Progressing  Intervention: Optimize Oxygenation and Ventilation  Recent Flowsheet Documentation  Taken 5/1/2024 1200 by Ebony Madsen RN  Airway/Ventilation Management: airway patency maintained  Head of Bed (HOB) Positioning:   HOB at 30-45 degrees   HOB at 60-90 degrees  Taken 5/1/2024 0800 by Ebony Madsen RN  Airway/Ventilation Management: airway patency maintained  Head of Bed (HOB) Positioning:   HOB at 30-45 degrees   HOB at 60-90 degrees     Problem: Fatigue  Goal: Improved Activity Tolerance  Intervention: Promote Improved Energy  Recent Flowsheet Documentation  Taken 5/1/2024 1200 by Ebony Madsen RN  Activity Management: activity adjusted per tolerance  Taken 5/1/2024 0800 by Ebony Madsen RN  Activity Management: activity adjusted per tolerance     Problem: Comorbidity Management  Goal: Maintenance of Heart Failure Symptom Control  Intervention: Maintain Heart Failure Management  Recent Flowsheet Documentation  Taken 5/1/2024 1200 by Ebony Madsen RN  Medication Review/Management: medications reviewed  Taken 5/1/2024 0800 by Ebony Madsen  RN  Medication Review/Management: medications reviewed   Goal Outcome Evaluation:

## 2024-05-01 NOTE — PLAN OF CARE
Physical Therapy Discharge Summary    Reason for therapy discharge:    Discharged to transitional care facility.    Progress towards therapy goal(s). See goals on Care Plan in [facility] electronic health record for goal details.  Goals not met.  Barriers to achieving goals:   discharge from facility.    Therapy recommendation(s):    Continued therapy is recommended.  Rationale/Recommendations:  PT as indicate at TCU.

## 2024-05-01 NOTE — PROGRESS NOTES
Care Management Discharge Note    Discharge Date: 05/01/2024     Discharge Disposition: [facility]    Additional Information:  Notified by RN that pt has family that can transport at 1500 to TCU. Called [facility] to cancel [facility] transport that was scheduled from [id number]. Facility notified of updated transport time.     YEFRI Hung, [facility]   Inpatient Care Coordination  [facility]   [phone]

## 2024-05-02 ENCOUNTER — DOCUMENTATION ONLY (OUTPATIENT)
Dept: GERIATRICS | Facility: CLINIC | Age: 74
End: 2024-05-02
Payer: COMMERCIAL

## 2024-05-02 LAB
BACTERIA BLD CULT: NO GROWTH
BACTERIA BLD CULT: NO GROWTH

## 2024-05-02 PROCEDURE — 86481 TB AG RESPONSE T-CELL SUSP: CPT | Performed by: NURSE PRACTITIONER

## 2024-05-03 ENCOUNTER — TRANSITIONAL CARE UNIT VISIT (OUTPATIENT)
Dept: GERIATRICS | Facility: CLINIC | Age: 74
End: 2024-05-03
Payer: COMMERCIAL

## 2024-05-03 VITALS
HEIGHT: 60 IN | SYSTOLIC BLOOD PRESSURE: 149 MMHG | RESPIRATION RATE: 18 BRPM | DIASTOLIC BLOOD PRESSURE: 88 MMHG | TEMPERATURE: 96.8 F | BODY MASS INDEX: 33.2 KG/M2 | OXYGEN SATURATION: 92 % | HEART RATE: 100 BPM

## 2024-05-03 DIAGNOSIS — N18.30 STAGE 3 CHRONIC KIDNEY DISEASE, UNSPECIFIED WHETHER STAGE 3A OR 3B CKD (H): ICD-10-CM

## 2024-05-03 DIAGNOSIS — N18.30 TYPE 2 DIABETES MELLITUS WITH STAGE 3 CHRONIC KIDNEY DISEASE, WITH LONG-TERM CURRENT USE OF INSULIN, UNSPECIFIED WHETHER STAGE 3A OR 3B CKD (H): ICD-10-CM

## 2024-05-03 DIAGNOSIS — J96.01 ACUTE RESPIRATORY FAILURE WITH HYPOXIA (H): ICD-10-CM

## 2024-05-03 DIAGNOSIS — Z85.3 PERSONAL HISTORY OF MALIGNANT NEOPLASM OF BREAST: ICD-10-CM

## 2024-05-03 DIAGNOSIS — Z79.4 TYPE 2 DIABETES MELLITUS WITH STAGE 3 CHRONIC KIDNEY DISEASE, WITH LONG-TERM CURRENT USE OF INSULIN, UNSPECIFIED WHETHER STAGE 3A OR 3B CKD (H): ICD-10-CM

## 2024-05-03 DIAGNOSIS — E11.22 TYPE 2 DIABETES MELLITUS WITH STAGE 3 CHRONIC KIDNEY DISEASE, WITH LONG-TERM CURRENT USE OF INSULIN, UNSPECIFIED WHETHER STAGE 3A OR 3B CKD (H): ICD-10-CM

## 2024-05-03 DIAGNOSIS — R53.1 GENERALIZED WEAKNESS: Primary | ICD-10-CM

## 2024-05-03 DIAGNOSIS — C79.51 CARCINOMA OF BREAST METASTATIC TO BONE, UNSPECIFIED LATERALITY (H): ICD-10-CM

## 2024-05-03 DIAGNOSIS — C50.919 CARCINOMA OF BREAST METASTATIC TO BONE, UNSPECIFIED LATERALITY (H): ICD-10-CM

## 2024-05-03 DIAGNOSIS — Z71.89 ADVANCED DIRECTIVES, COUNSELING/DISCUSSION: ICD-10-CM

## 2024-05-03 DIAGNOSIS — B33.8 RSV INFECTION: ICD-10-CM

## 2024-05-03 DIAGNOSIS — I50.20 HFREF (HEART FAILURE WITH REDUCED EJECTION FRACTION) (H): ICD-10-CM

## 2024-05-03 DIAGNOSIS — F41.9 ANXIETY: ICD-10-CM

## 2024-05-03 LAB
QUANTIFERON MITOGEN: 3.36 IU/ML
QUANTIFERON NIL TUBE: 0.01 IU/ML
QUANTIFERON TB1 TUBE: 0.01 IU/ML
QUANTIFERON TB2 TUBE: 0.02

## 2024-05-03 PROCEDURE — 99309 SBSQ NF CARE MODERATE MDM 30: CPT | Performed by: NURSE PRACTITIONER

## 2024-05-03 NOTE — PROGRESS NOTES
[facility] GERIATRICS    PRIMARY CARE PROVIDER AND CLINIC:  Rolf Simonson, [facility] [address] [place] [address] / [place]  Chief Complaint   Patient presents with    Hospital F/U      [place] Medical Record Number:  [id number]  Place of Service where encounter took place:  [facility] (TCU) [[id number]]    Cathleen Ty  is a 73 year old  (1950), admitted to the above facility from  [facility]. Hospital stay 4/27/24 through 5/1/24..   HPI:    73 year old female PMHx HFrEF with most recent EF 33%, breast cancer with metastasis to bone and follows with Dr. Stiles of oncology through [facility] currently on Ibrance, DM2 with insulin dependence, BEN, fibromyalgia, hyperlipidemia, chronic kidney disease, GERD, and SSS s/p biventricular pacemaker hospitalized with generalized weakness and RSV infection. On admission Na of 135. K 3.5, BUN 21, Cr 1.16, WBC 3.5 and Hgb 12.2 with PLT 84. Treated with DuoNebs, mucolytics and cough suppressants. Remains on aldactone but lasix stopped. To TCU for rehab.     Patient is seen for initial TCU visit, history obtained from patient, staff and extensive review of the chart.  Asks to be DNR/DNI. Denies pain, dyspnea, bowel issues, bladder issues. BP range /63-88 and sats 95% room air. HR 90-100s. Plans to discharge home on 5/5 with home care.     CODE STATUS/ADVANCE DIRECTIVES DISCUSSION:  No CPR- Do NOT Intubate    ALLERGIES:   Allergies   Allergen Reactions    Aspirin [Dihydroxyaluminum Aminoacetate] GI Disturbance    Dilaudid [Hydromorphone Hcl] Other (See Comments)     Emotional, insomnia    Lipitor [Atorvastatin Calcium]      Chest pain    Penicillins     Percocet [Oxycodone-Acetaminophen] Other (See Comments)     Emotional, insomnia    Sulfa Antibiotics Itching and Rash      PAST MEDICAL HISTORY:   Past Medical History:   Diagnosis Date    Back pain     CHF (congestive heart failure) (H)     Hyperlipemia     Malignant  neoplasm of breast (female), unspecified site 1/2012    Bilateral breast    Tubbs neuroma     L5      PAST SURGICAL HISTORY:   has a past surgical history that includes tonsillectomy; appendectomy; tubal ligation; breast biopsy, core rt/lt (1/2012); IR Lumbar Puncture (8/2/2021); IR Lumbar Puncture (3/7/2016); IR Lumbar Puncture (4/25/2016); IR Lumbar Puncture (5/8/2019); IR Lumbar Puncture (7/16/2019); IR Lumbar Puncture (9/6/2022); IR Lumbar Puncture (6/7/2017); IR Lumbar Puncture (7/10/2017); IR Lumbar Puncture (2/22/2018); IR Lumbar Puncture (4/11/2018); IR Lumbar Puncture (8/24/2018); and IR Chest Port Placement > 5 Yrs of Age (6/5/2012).  FAMILY HISTORY: family history includes Heart Disease in her father and mother.  SOCIAL HISTORY:   reports that she has never smoked. She has never used smokeless tobacco. She reports that she does not use drugs.  Patient's living condition: lives alone    Post Discharge Medication Reconciliation Status:   MED REC REQUIRED  Post Medication Reconciliation Status: discharge medications reconciled and changed, per note/orders       Current Outpatient Medications   Medication Sig Dispense Refill    acetaminophen (TYLENOL) 325 MG tablet Take 2 tablets (650 mg) by mouth every 4 hours as needed for mild pain or other (and adjunct with moderate or severe pain or per patient request)      acyclovir (ZOVIRAX) 400 MG tablet Take 400 mg by mouth daily      aspirin (ASA) 81 MG chewable tablet Take 81 mg by mouth daily      benzocaine-menthol (CHLORASEPTIC) 6-10 MG lozenge Place 1 lozenge inside cheek every hour as needed for sore throat      benzonatate (TESSALON) 100 MG capsule Take 1 capsule (100 mg) by mouth 3 times daily as needed for cough      cetirizine (ZYRTEC) 10 MG tablet Take 10 mg by mouth daily      co-enzyme Q-10 100 MG CAPS capsule Take by mouth daily      cyclobenzaprine (FLEXERIL) 10 MG tablet Take 10 mg by mouth daily as needed.      DULoxetine (CYMBALTA) 30 MG capsule  Take 30 mg by mouth daily      finasteride (PROSCAR) 5 MG tablet Take 2.5 mg by mouth daily      guaiFENesin (MUCINEX) 600 MG 12 hr tablet Take 1 tablet (600 mg) by mouth 2 times daily      letrozole (FEMARA) 2.5 MG tablet Take 2.5 mg by mouth daily      LORazepam (ATIVAN) 0.5 MG tablet Take 1-2 tablets (0.5-1 mg) by mouth nightly as needed for anxiety 7 tablet 0    montelukast (SINGULAIR) 10 MG tablet Take 10 mg by mouth At Bedtime      OLANZapine (ZYPREXA) 5 MG tablet Take 5-10 mg by mouth at bedtime      pantoprazole (PROTONIX) 40 MG EC tablet Take 40 mg by mouth daily      Pregabalin (LYRICA) 200 MG capsule Take 1 capsule (200 mg) by mouth 2 times daily 14 capsule 0    rosuvastatin (CRESTOR) 20 MG tablet Take 20 mg by mouth at bedtime      spironolactone (ALDACTONE) 25 MG tablet Take 12.5 mg by mouth daily      torsemide (DEMADEX) 20 MG tablet Take 60 mg by mouth daily       No current facility-administered medications for this visit.       ROS:  10 point ROS of systems including Constitutional, Eyes, Respiratory, Cardiovascular, Gastroenterology, Genitourinary, Integumentary, Musculoskeletal, Psychiatric were all negative except for pertinent positives noted in my HPI.    Vitals:  BP (!) 149/88   Pulse 100   Temp 96.8  F (36  C)   Resp 18   Ht 1.524 m (5')   SpO2 92%   BMI 33.20 kg/m    Exam:  GENERAL APPEARANCE:  Alert, in no distress, pleasant, cooperative, oriented x 4  EYES:  EOM, lids, pupils and irises normal, sclera clear and conjunctiva normal, no discharge or mattering on lids or lashes noted  ENT:  Mouth normal, moist mucous membranes, nose normal without drainage or crusting, external ears without lesions, hearing acuity intact  NECK: supple, symmetrical, trachea midline  RESP:  respiratory effort normal, no chest wall tenderness, no respiratory distress, Lung sounds clear, patient is on room air  CV:  Auscultation of heart done, rate and rhythm controlled and regular, no murmur, no rub or  gallop. Edema none bilateral lower extremities  ABDOMEN:  normal bowel sounds, soft, nontender, no palpable masses.  M/S:   Gait and station walks with assist , no tenderness or swelling of the joints; able to move all extremities, digits normal  NEURO: cranial nerves 2-12 grossly intact, no facial asymmetry, no speech deficits and able to follow directions, moves all extremities symmetrically  PSYCH:  insight and judgement and memory intact, affect and mood normal     Lab/Diagnostic data:  Most Recent 3 CBC's:  Recent Labs   Lab Test 04/29/24  0833 04/28/24  0708 04/27/24  0732   WBC 4.0 3.6* 3.5*   HGB 11.6* 9.8* 12.2   * 101* 100   PLT 61* 60* 84*     Most Recent 3 BMP's:  Recent Labs   Lab Test 05/01/24  1133 05/01/24  0803 05/01/24  0206 04/29/24  0940 04/29/24  0833 04/28/24  0747 04/28/24  0708 04/27/24  1515 04/27/24  0732   NA  --   --   --   --  144  --  142  --  135   POTASSIUM  --   --   --   --  4.1  --  3.7  --  3.5   CHLORIDE  --   --   --   --  110*  --  105  --  96*   CO2  --   --   --   --  22  --  23  --  25   BUN  --   --   --   --  15.7  --  17.7  --  21.1   CR  --   --   --   --  0.80  --  0.92  --  1.16*   ANIONGAP  --   --   --   --  12  --  14  --  14   BUD  --   --   --   --  9.6  --  9.0  --  8.9   * 131* 146*   < > 119*   < > 122*   < > 133*    < > = values in this interval not displayed.       ASSESSMENT/PLAN:  Generalized weakness  RSV infection  Acute respiratory failure with hypoxia (H)  Acute, improved. Continue tylenol 650 mg every 4 hrs PRN pain/fevers, chloraseptic lozenge PRN sore throat, tessalon 100 mg TID PRN cough, zyrtec 20 mg daily, Mucinex 600 mg BID, Singulair 10 mg HS. Monitor respiratory status.     Personal history of malignant neoplasm of breast  Carcinoma of breast metastatic to bone, unspecified laterality (H)  Continue zovirax 400 mg daily for viral prophylaxis, Femara 2.5 mg daily. Oncology follow-up per home routine.     HFrEF (heart failure with  reduced ejection fraction) (H)  Chronic, stable. Continue aldactone 12.5 mg daily, Demadex 60 mg daily, follow-up BMP at PCP follow-up. Monitor vs and wt. Follow-up ranges next visit.     HLD  Continue Crestor 20 mg daily.     Type 2 diabetes mellitus with stage 3 chronic kidney disease, with long-term current use of insulin, unspecified whether stage 3 a or 3b CKD (H)  By history, currently not on medications. Monitor BG PRN.     Stage 3 chronic kidney disease, unspecified whether stage 3 a or 3b CKD (H)  Chronic Cr 0.8 last check. Avoid nephrotoxins. Monitor renal function per home routine.     Anxiety  Continue ativan 0.5-1 mg HS PRN, Cymbalta 30 mg daily, Zyprexa 5-10 mg HS, monitor mood.     Advanced directives, counseling/discussion  Asks to be DNR/DNI    Orders:  DNR/DNI  May discharge home with  HC for RN and HHA on 5/5    Electronically signed by:  TRISTAN Ford CNP     Documentation of Face to Face and Certification for Home Health Services    I certify that services are/were furnished while this patient was under the care of a physician and that a physician or an allowed non-physician practitioner (NPP), had a face-to-face encounter that meets the physician face-to-face encounter requirements. The encounter was in whole, or in part, related to the primary reason for home health. The patient is confined to his/her home and needs intermittent skilled nursing, physical therapy, speech-language pathology, or the continued need for occupational therapy. A plan of care has been established by a physician and is periodically reviewed by a physician.  Date of Face-to-Face Encounter: 5/3/2024.    I certify that, based on my findings, the following services are medically necessary home health services: Nursing and HHA .    My clinical findings support the need for the above skilled services because: Requires assistance of another person or specialized equipment to access medical services because  patient: Is unable to exit home safely on own due to: weakness, dyspnea..    Patient to re-establish plan of care with their PCP within 7-10 days after leaving the facility to reestablish care.  Medicare certified [employer] provider: TRISTAN Ford CNP  Date: May 3, 2024

## 2024-05-04 LAB
GAMMA INTERFERON BACKGROUND BLD IA-ACNC: 0.01 IU/ML
M TB IFN-G BLD-IMP: NEGATIVE
M TB IFN-G CD4+ BCKGRND COR BLD-ACNC: 3.35 IU/ML
MITOGEN IGNF BCKGRD COR BLD-ACNC: 0 IU/ML
MITOGEN IGNF BCKGRD COR BLD-ACNC: 0.01 IU/ML

## 2024-05-06 ENCOUNTER — DOCUMENTATION ONLY (OUTPATIENT)
Dept: OTHER | Facility: CLINIC | Age: 74
End: 2024-05-06
Payer: COMMERCIAL

## 2024-05-07 PROBLEM — C50.919 CARCINOMA OF BREAST METASTATIC TO BONE, UNSPECIFIED LATERALITY (H): Status: ACTIVE | Noted: 2024-05-07

## 2024-05-07 PROBLEM — J96.01 ACUTE RESPIRATORY FAILURE WITH HYPOXIA (H): Status: ACTIVE | Noted: 2024-05-07

## 2024-05-07 PROBLEM — Z79.4 TYPE 2 DIABETES MELLITUS WITH STAGE 3 CHRONIC KIDNEY DISEASE, WITH LONG-TERM CURRENT USE OF INSULIN, UNSPECIFIED WHETHER STAGE 3A OR 3B CKD (H): Status: ACTIVE | Noted: 2024-05-07

## 2024-05-07 PROBLEM — C79.51 CARCINOMA OF BREAST METASTATIC TO BONE, UNSPECIFIED LATERALITY (H): Status: ACTIVE | Noted: 2024-05-07

## 2024-05-07 PROBLEM — N18.30 STAGE 3 CHRONIC KIDNEY DISEASE, UNSPECIFIED WHETHER STAGE 3A OR 3B CKD (H): Status: ACTIVE | Noted: 2024-05-07

## 2024-05-07 PROBLEM — E11.22 TYPE 2 DIABETES MELLITUS WITH STAGE 3 CHRONIC KIDNEY DISEASE, WITH LONG-TERM CURRENT USE OF INSULIN, UNSPECIFIED WHETHER STAGE 3A OR 3B CKD (H): Status: ACTIVE | Noted: 2024-05-07

## 2024-05-07 PROBLEM — N18.30 TYPE 2 DIABETES MELLITUS WITH STAGE 3 CHRONIC KIDNEY DISEASE, WITH LONG-TERM CURRENT USE OF INSULIN, UNSPECIFIED WHETHER STAGE 3A OR 3B CKD (H): Status: ACTIVE | Noted: 2024-05-07

## 2024-06-18 ENCOUNTER — LAB REQUISITION (OUTPATIENT)
Dept: LAB | Facility: CLINIC | Age: 74
End: 2024-06-18
Payer: COMMERCIAL

## 2024-06-18 DIAGNOSIS — Z11.1 ENCOUNTER FOR SCREENING FOR RESPIRATORY TUBERCULOSIS: ICD-10-CM

## 2024-06-19 ENCOUNTER — LAB REQUISITION (OUTPATIENT)
Dept: LAB | Facility: CLINIC | Age: 74
End: 2024-06-19
Payer: COMMERCIAL

## 2024-06-19 DIAGNOSIS — K59.00 CONSTIPATION, UNSPECIFIED: ICD-10-CM

## 2024-06-19 DIAGNOSIS — M19.011 PRIMARY OSTEOARTHRITIS, RIGHT SHOULDER: ICD-10-CM

## 2024-06-19 DIAGNOSIS — I50.23 ACUTE ON CHRONIC SYSTOLIC (CONGESTIVE) HEART FAILURE (H): ICD-10-CM

## 2024-06-19 DIAGNOSIS — K21.9 GASTRO-ESOPHAGEAL REFLUX DISEASE WITHOUT ESOPHAGITIS: ICD-10-CM

## 2024-06-19 DIAGNOSIS — J30.9 ALLERGIC RHINITIS, UNSPECIFIED: ICD-10-CM

## 2024-06-19 PROCEDURE — 86481 TB AG RESPONSE T-CELL SUSP: CPT | Mod: ORL | Performed by: FAMILY MEDICINE

## 2024-06-19 PROCEDURE — 36415 COLL VENOUS BLD VENIPUNCTURE: CPT | Mod: ORL | Performed by: FAMILY MEDICINE

## 2024-06-19 PROCEDURE — P9604 ONE-WAY ALLOW PRORATED TRIP: HCPCS | Mod: ORL | Performed by: FAMILY MEDICINE

## 2024-06-20 ENCOUNTER — LAB REQUISITION (OUTPATIENT)
Dept: LAB | Facility: CLINIC | Age: 74
End: 2024-06-20
Payer: COMMERCIAL

## 2024-06-20 DIAGNOSIS — M19.011 PRIMARY OSTEOARTHRITIS, RIGHT SHOULDER: ICD-10-CM

## 2024-06-20 DIAGNOSIS — I50.23 ACUTE ON CHRONIC SYSTOLIC (CONGESTIVE) HEART FAILURE (H): ICD-10-CM

## 2024-06-20 DIAGNOSIS — K21.9 GASTRO-ESOPHAGEAL REFLUX DISEASE WITHOUT ESOPHAGITIS: ICD-10-CM

## 2024-06-20 DIAGNOSIS — J30.9 ALLERGIC RHINITIS, UNSPECIFIED: ICD-10-CM

## 2024-06-20 DIAGNOSIS — K59.00 CONSTIPATION, UNSPECIFIED: ICD-10-CM

## 2024-06-20 LAB
GAMMA INTERFERON BACKGROUND BLD IA-ACNC: 0 IU/ML
M TB IFN-G BLD-IMP: NEGATIVE
M TB IFN-G CD4+ BCKGRND COR BLD-ACNC: 9.36 IU/ML
MITOGEN IGNF BCKGRD COR BLD-ACNC: 0 IU/ML
MITOGEN IGNF BCKGRD COR BLD-ACNC: 0.01 IU/ML
QUANTIFERON MITOGEN: 9.36 IU/ML
QUANTIFERON NIL TUBE: 0 IU/ML
QUANTIFERON TB1 TUBE: 0.01 IU/ML
QUANTIFERON TB2 TUBE: 0

## 2024-06-21 LAB
ANION GAP SERPL CALCULATED.3IONS-SCNC: 11 MMOL/L (ref 7–15)
BUN SERPL-MCNC: 26.5 MG/DL (ref 8–23)
CALCIUM SERPL-MCNC: 9.6 MG/DL (ref 8.8–10.2)
CHLORIDE SERPL-SCNC: 103 MMOL/L (ref 98–107)
CREAT SERPL-MCNC: 0.81 MG/DL (ref 0.51–0.95)
DEPRECATED HCO3 PLAS-SCNC: 26 MMOL/L (ref 22–29)
EGFRCR SERPLBLD CKD-EPI 2021: 76 ML/MIN/1.73M2
GLUCOSE SERPL-MCNC: 149 MG/DL (ref 70–99)
MAGNESIUM SERPL-MCNC: 1.9 MG/DL (ref 1.7–2.3)
POTASSIUM SERPL-SCNC: 3.6 MMOL/L (ref 3.4–5.3)
SODIUM SERPL-SCNC: 140 MMOL/L (ref 135–145)

## 2024-06-21 PROCEDURE — 83735 ASSAY OF MAGNESIUM: CPT | Mod: ORL | Performed by: FAMILY MEDICINE

## 2024-06-21 PROCEDURE — 80048 BASIC METABOLIC PNL TOTAL CA: CPT | Mod: ORL | Performed by: FAMILY MEDICINE

## 2024-06-21 PROCEDURE — P9604 ONE-WAY ALLOW PRORATED TRIP: HCPCS | Mod: ORL | Performed by: FAMILY MEDICINE

## 2024-06-21 PROCEDURE — 36415 COLL VENOUS BLD VENIPUNCTURE: CPT | Mod: ORL | Performed by: FAMILY MEDICINE

## 2024-06-24 LAB
ERYTHROCYTE [DISTWIDTH] IN BLOOD BY AUTOMATED COUNT: 17.3 % (ref 10–15)
HCT VFR BLD AUTO: 34.6 % (ref 35–47)
HGB BLD-MCNC: 11.4 G/DL (ref 11.7–15.7)
MCH RBC QN AUTO: 34.4 PG (ref 26.5–33)
MCHC RBC AUTO-ENTMCNC: 32.9 G/DL (ref 31.5–36.5)
MCV RBC AUTO: 105 FL (ref 78–100)
PLATELET # BLD AUTO: 103 10E3/UL (ref 150–450)
RBC # BLD AUTO: 3.31 10E6/UL (ref 3.8–5.2)
WBC # BLD AUTO: 4.5 10E3/UL (ref 4–11)

## 2024-06-24 PROCEDURE — P9603 ONE-WAY ALLOW PRORATED MILES: HCPCS | Mod: ORL | Performed by: FAMILY MEDICINE

## 2024-06-24 PROCEDURE — 36415 COLL VENOUS BLD VENIPUNCTURE: CPT | Mod: ORL | Performed by: FAMILY MEDICINE

## 2024-06-24 PROCEDURE — 85027 COMPLETE CBC AUTOMATED: CPT | Mod: ORL | Performed by: FAMILY MEDICINE

## 2024-06-27 ENCOUNTER — LAB REQUISITION (OUTPATIENT)
Dept: LAB | Facility: CLINIC | Age: 74
End: 2024-06-27
Payer: COMMERCIAL

## 2024-06-27 DIAGNOSIS — I11.0 HYPERTENSIVE HEART DISEASE WITH HEART FAILURE (H): ICD-10-CM

## 2024-07-01 LAB
ANION GAP SERPL CALCULATED.3IONS-SCNC: 13 MMOL/L (ref 7–15)
BUN SERPL-MCNC: 14 MG/DL (ref 8–23)
CALCIUM SERPL-MCNC: 9.4 MG/DL (ref 8.8–10.2)
CHLORIDE SERPL-SCNC: 104 MMOL/L (ref 98–107)
CREAT SERPL-MCNC: 0.8 MG/DL (ref 0.51–0.95)
DEPRECATED HCO3 PLAS-SCNC: 28 MMOL/L (ref 22–29)
EGFRCR SERPLBLD CKD-EPI 2021: 77 ML/MIN/1.73M2
GLUCOSE SERPL-MCNC: 109 MG/DL (ref 70–99)
POTASSIUM SERPL-SCNC: 3.5 MMOL/L (ref 3.4–5.3)
SODIUM SERPL-SCNC: 145 MMOL/L (ref 135–145)

## 2024-07-01 PROCEDURE — 36415 COLL VENOUS BLD VENIPUNCTURE: CPT | Mod: ORL | Performed by: FAMILY MEDICINE

## 2024-07-01 PROCEDURE — 80048 BASIC METABOLIC PNL TOTAL CA: CPT | Mod: ORL | Performed by: FAMILY MEDICINE

## 2024-07-01 PROCEDURE — P9604 ONE-WAY ALLOW PRORATED TRIP: HCPCS | Mod: ORL | Performed by: FAMILY MEDICINE
